# Patient Record
Sex: FEMALE | Race: WHITE | NOT HISPANIC OR LATINO | Employment: STUDENT | URBAN - METROPOLITAN AREA
[De-identification: names, ages, dates, MRNs, and addresses within clinical notes are randomized per-mention and may not be internally consistent; named-entity substitution may affect disease eponyms.]

---
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
## 2022-01-07 ENCOUNTER — TELEPHONE (OUTPATIENT)
Dept: PSYCHIATRY | Facility: CLINIC | Age: 22
End: 2022-01-07
Payer: MEDICAID

## 2022-01-10 ENCOUNTER — PATIENT MESSAGE (OUTPATIENT)
Dept: PSYCHIATRY | Facility: CLINIC | Age: 22
End: 2022-01-10
Payer: MEDICAID

## 2022-01-10 ENCOUNTER — OFFICE VISIT (OUTPATIENT)
Dept: PSYCHIATRY | Facility: CLINIC | Age: 22
End: 2022-01-10
Payer: MEDICAID

## 2022-01-10 DIAGNOSIS — F41.1 GENERALIZED ANXIETY DISORDER: ICD-10-CM

## 2022-01-10 DIAGNOSIS — F31.62 MIXED BIPOLAR AFFECTIVE DISORDER, MODERATE: Primary | ICD-10-CM

## 2022-01-10 DIAGNOSIS — F90.9 ATTENTION DEFICIT HYPERACTIVITY DISORDER (ADHD), UNSPECIFIED ADHD TYPE: ICD-10-CM

## 2022-01-10 PROCEDURE — 90834 PSYTX W PT 45 MINUTES: CPT | Mod: AJ,HA,95, | Performed by: SOCIAL WORKER

## 2022-01-10 PROCEDURE — 90834 PR PSYCHOTHERAPY W/PATIENT, 45 MIN: ICD-10-PCS | Mod: AJ,HA,95, | Performed by: SOCIAL WORKER
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
## 2022-01-31 ENCOUNTER — TELEPHONE (OUTPATIENT)
Dept: PSYCHIATRY | Facility: CLINIC | Age: 22
End: 2022-01-31
Payer: MEDICAID

## 2022-01-31 ENCOUNTER — OFFICE VISIT (OUTPATIENT)
Dept: PSYCHIATRY | Facility: CLINIC | Age: 22
End: 2022-01-31
Payer: MEDICAID

## 2022-01-31 ENCOUNTER — PATIENT MESSAGE (OUTPATIENT)
Dept: PSYCHIATRY | Facility: CLINIC | Age: 22
End: 2022-01-31

## 2022-01-31 DIAGNOSIS — F41.1 GENERALIZED ANXIETY DISORDER: ICD-10-CM

## 2022-01-31 DIAGNOSIS — F31.62 MIXED BIPOLAR AFFECTIVE DISORDER, MODERATE: Primary | ICD-10-CM

## 2022-01-31 PROCEDURE — 99215 OFFICE O/P EST HI 40 MIN: CPT | Mod: HP,HA,95, | Performed by: PSYCHOLOGIST

## 2022-01-31 PROCEDURE — 1159F PR MEDICATION LIST DOCUMENTED IN MEDICAL RECORD: ICD-10-PCS | Mod: HP,HA,CPTII,95 | Performed by: PSYCHOLOGIST

## 2022-01-31 PROCEDURE — 99215 PR OFFICE/OUTPT VISIT, EST, LEVL V, 40-54 MIN: ICD-10-PCS | Mod: HP,HA,95, | Performed by: PSYCHOLOGIST

## 2022-01-31 PROCEDURE — 1159F MED LIST DOCD IN RCRD: CPT | Mod: HP,HA,CPTII,95 | Performed by: PSYCHOLOGIST

## 2022-01-31 RX ORDER — ARIPIPRAZOLE 15 MG/1
15 TABLET ORAL DAILY
Qty: 30 TABLET | Refills: 0 | Status: SHIPPED | OUTPATIENT
Start: 2022-02-07 | End: 2022-03-07 | Stop reason: SDUPTHER

## 2022-01-31 RX ORDER — ARIPIPRAZOLE 10 MG/1
10 TABLET ORAL DAILY
Qty: 7 TABLET | Refills: 0 | Status: SHIPPED | OUTPATIENT
Start: 2022-01-31 | End: 2022-02-07

## 2022-01-31 RX ORDER — ARIPIPRAZOLE 300 MG
300 KIT INTRAMUSCULAR
Qty: 1 EACH | Refills: 11 | Status: SHIPPED | OUTPATIENT
Start: 2022-02-21 | End: 2022-05-11

## 2022-01-31 NOTE — PATIENT INSTRUCTIONS
"[facility] OF [facility]   PATIENT INFORMATION    We appreciate the opportunity to participate in your medical care and hope the following protocols will make it easier for you to receive quality treatment in our department.    PUNCTUALITY: Your appointment is scheduled for a fixed amount of time, reserved especially for you.  To get the benefit of your appointment, please arrive at least 15 minutes early to allow time for traffic, parking and registration.  Should you arrive more than 15 minutes late to your appointment, you will be rescheduled in order to assure your clinician has adequate time to assess you and provide helpful care.      APPOINTMENTS: Appointments are made by the nursing/front office staff or through the patient portal. Providers do not have access  to schedule appointments. Walk in appointments are not available. FOR EMERGENCIES, PLEASE GO THE CLOSEST EMERGENCY ROOM.    CANCELLATION/MISSED APPOINTMENTS:   In order to receive quality care, all appointments must be kept.  If you are unable to keep an appointment, please reschedule at least 3 days prior if possible. Late cancellations (within 24 hours of the appointment) and repeated no-show appointments may result in dismissal from the clinic. After two no show/late cancellation visits, you will receive a notice letter, alerting you to keep visits to prevent department dismissal. If another visit is missed after receipt of the notice, you will be discharged from the clinic. This policy is in effect to allow for other individuals on a long waiting list to be seen as soon as possible. Unlike other branches of medicine where several individuals can be scheduled in a 30 minute time slot, only one individual can be scheduled in any time slot in Psychiatry.     MESSAGES: For simple questions/concerns, you may contact your individual providers electronically through the "[facility]" portal or by calling [phone] " with messages relayed via office staff. If relevant, include pharmacy name and phone number, date of last visit and next scheduled visit, phone number where you can be reached throughout the day, and whether leaving a voicemail or message on an answering machine is acceptable. Messages will be returned by the Medical Assistant or Office Staff after your provider has reviewed the message.  Please allow 24 hours for a returned message before leaving another message. Messages will be checked each workday (Monday through Friday) during office hours (8:00 a.m. and 5:00 p.m.) and returned at most within one business day.  You may leave a non-urgent message after hours. Note that psychotherapy and medication management are not appropriate by telephone or the patient portal.    PRESCRIPTION REFILLS:  Please communicate with your prescriber about any refills you need during your appointment. You may also request refills through the [facility] portal (preferred) or by calling the clinic. Prescriptions will be filled during office hours.      Please do not wait until you are completely out of medication to request refills. Same day refills are not always possible. Patients may experience symptoms of withdrawal if they run out of medications. The patient assumes all responsibility when there is an issue with non-compliance with follow-up appointments and medications.   Some medications are controlled and regulated by the FDA and ROX. Some of these medications can not be refilled before 30 days and require a face to face appointment.     PAPERWORK REQUESTS: If you have any forms or letters that need to be completed by your doctor, please present these at the beginning of the appointment to ensure that information needed to complete them is obtained during the office visit. Paperwork will be returned within 7-10 business days. Staff will call you to  the paperwork when completed.    SPECIAL EVALUATIONS: Please note that  "our department is treatment-focused. As such, we focus on treatment-oriented evaluations and do not perform specialty or "forensic" evaluations. Examples are listed below.     Disability: We do not do disability evaluations.  Please contact Social Security Administration for evaluations and determinations. You will then sign releases allowing for records from your treatment providers to be forwarded to Social Security Administration to use in their evaluation.   Gun Permit: We do not offer Sound Judgment Evaluations or assessments leading to gun ownership, nor do we fill out or file paperwork relevant to owning, concealing or purchasing a firearm.   Emotional Support      Animals (IAN): We do not provide documentation, including letters, to aid in the acclamation that an Emotional Support Animal is required. Note that ESAs are not trained to perform tasks or recognize particular signs or symptoms. Rather, they are distinguished by the close, emotional, and supportive bond between the animal and the owner.       SAMPLES: We do not provide samples of any medications. If you have financial difficulties and are on a limited income, you may qualify for Patient Assistance Programs from various pharmaceutical companies. This will require that you complete paperwork with your financial information, but this does not guarantee that the company will approve the application. Alternative medication options can be discussed.    REFERRALS/COORDINATION: You will be referred to other providers if we feel unable to adequately diagnose or treat your particular condition, or if collaboration with another provider would allow for better management of your condition.    This document is for information purposes only. Please refer to the full disclaimer and copyright statement available at http://www.[facility].health.wa.gov.au regarding the information from this website before making use of such information.  See website " www.cci.health.wa.gov.au for more handouts and resources.    What is Sleep Hygiene?  Sleep hygiene is the term used to describe good sleep habits. Considerable research has gone into developing a set of guidelines and tips which are designed to enhance good sleeping, and there is much evidence to suggest that these strategies can provide long-term solutions to sleep difficulties. There are many medications which are used to treat insomnia,  but these tend to be only effective in the short-term. Ongoing use of sleeping pills may lead to dependence and interfere with developing good sleep habits independent of medication,  thereby prolonging sleep difficulties. Talk to your health professional about what is right for you, but we recommend good sleep hygiene as an important part of treating insomnia,  either with other strategies such as medication or cognitive therapy or alone.    Sleep Hygiene Tips  1) Get regular. One of the best ways to train your body to sleep well is to go to bed and get up at more or less the same time every day, even on weekends and days off! This regular rhythm will make you feel better and will give your body something to work from.  2) Sleep when sleepy. Only try to sleep when you actually feel tired or sleepy, rather than spending too much time awake in bed.  3) Get up & try again. If you havent been able to get to sleep after about 20 minutes or more, get up and do something calming or boring until you feel sleepy, then return to bed and try again. Sit quietly on the couch with the lights off (bright light will tell your brain that it is time to wake up), or read something boring like the phone book. Avoid doing anything that is too stimulating or interesting, as this will wake you up even more.  4) Avoid caffeine & nicotine. It is best to avoid consuming any caffeine (in coffee, tea, cola drinks, chocolate, and some medications) or nicotine (cigarettes) for at least 4-6 hours before  going to bed. These substances act as stimulants and interfere with the ability to fall asleep   5) Avoid alcohol. It is also best to avoid alcohol for at least 4-6 hours before going to bed. Many people believe that alcohol is relaxing and helps them to get to sleep at first, but it actually interrupts the quality of sleep.  6) Bed is for sleeping. Try not to use your bed for anything other than sleeping and sex, so that your body comes to associate bed with sleep. If you use bed as a place to watch TV, eat, read, work on your laptop, pay bills, and other things, your body will not learn this connection.  7) No naps. It is best to avoid taking naps during the day, to make sure that you are tired at bedtime. If you cant make it through the day without a nap, make sure it is for less than an hour and before 3pm.  8) Sleep rituals. You can develop your own rituals of things to remind your body that it is time to sleep - some people find it useful to do relaxing stretches or breathing exercises for 15 minutes before bed each night, or sit calmly with a cup of caffeine-free tea.  9) Bathtime. Having a hot bath 1-2 hours before bedtime can be useful, as it will raise your body temperature, causing you to feel sleepy as your body temperature drops again. Research shows that sleepiness is associated with a drop in body temperature.  10) No clock-watching. Many people who struggle with sleep tend to watch the clock too much. Frequently checking the clock during the night can wake you up (especially if you turn  on the light to read the time) and reinforces negative thoughts such as Oh no, look how late it is, Ill never get to sleep or its so early, I have only slept for 5 hours, this is  terrible.  11) Use a sleep diary. This worksheet can be a useful way of making sure you have the right facts about your sleep, rather than making assumptions. Because a diary involves watching  the clock (see point 10) it is a good  idea to only use it for two weeks to get an idea of what is going and then perhaps two months down the track to see how you are progressing.  12) Exercise. Regular exercise is a good idea to help with good sleep, but try not to do strenuous exercise in the 4 hours before bedtime. Morning walks are a great way to start the day feeling refreshed!  13) Eat right. A healthy, balanced diet will help you to sleep well, but timing is important. Some people find that a very empty stomach at bedtime is distracting, so it can be useful  to have a light snack, but a heavy meal soon before bed can also interrupt sleep. Some people recommend a warm glass of milk, which contains tryptophan, which acts as a natural  sleep inducer.  14) The right space. It is very important that your bed and bedroom are quiet and comfortable for sleeping. A cooler room with enough blankets to stay warm is best, and make sure you have curtains or an eyemask to block out early morning light and earplugs if there is noise outside your room.  15) Keep daytime routine the same. Even if you have a bad night sleep and are tired it is important that you try to keep your daytime activities the same as you had planned. That is,  dont avoid activities because you feel tired. This can reinforce the insomnia.    Call In if problems  Call Report Side Effects   Encouraged to follow up with primary care / Gen Med MD for continued monitoring of general health and wellness  Call 911 Or go to ER if Acute Concerns (especially if any thoughts of harm to self or other)    Abilify--oral 10 mg for one week, then 15 mg for 2 weeks--then injection while continuing 15 mg for another 2 weeks       Milli Medina, PhD,   Advanced Practice Medical Psychologist  [facility]  [address] [facility].  [place] [address]  [phone]   [phone] fax

## 2022-01-31 NOTE — TELEPHONE ENCOUNTER
Called Mahamed to inquire on appt in 3 weeks instead of 4--again reviewed dosing instructions--oral Abilify 10 mg for one week, then oral Abilify 15 mg for 2 weeks; will get injection at her appt and continue oral Abilify 15 mg for another 2 weeks, then stopping oral

## 2022-01-31 NOTE — PROGRESS NOTES
Outpatient Psychiatry Follow-Up Visit    1/31/2022    Timeframe: Corona Virus Outbreak     The patient location is: Patient's home/ Patient reported that his/her location at the time of this visit was in the [facility]     Visit type: Virtual visit with synchronous audio and video     Each patient to whom he or she provides medical services by telemedicine is: (1) informed of the relationship between the physician and patient and the respective role of any other health care provider with respect to management of the patient; and (2) notified that he or she may decline to receive medical services by telemedicine and may withdraw from such care at any time.    I also informed patient of the following:   Milli Medina, PhD, MPAP:  [place] medical license number: [id number]    My contact info:  [facility] / 2nd Floor  [address] [place] [address]   Ph: [phone]    If technology issues, call office phone: Ph: [phone]  If crisis: Dial 911 or go to nearest Emergency Room (ER)  If questions related to privacy practices: contact [facility] Information Department: [phone]    Chief Complaint:  Mahamed Fernandez is a 21 y.o. female who presents today for follow-up of mood, anxiety and inattention/distractibility .        Impressions/Plan from last visit: Tiffanie (mom) and Mahamed attended the visit. Mahamed has started pulling her hair and eyelashes again. Mom said that she did it years ago and thinks that she was on Abilify then for it. She said that she does it when she is nervous--and that's daily. This started back within the last month. She and her boyfriend have been arguing more. Mom said that her worry seems to be about relationships and financial choices, etc. She is in school this summer--Wed through Sat 9-5:30 and works daily until 10:30 pm. She had been going to the gym but has not been in awhile. We agreed to increase her Prozac and continue  her Zyprexa. She also needs to find better ways of coping with her anxiety and will address this in her therapy.    Interval History and Content of Current Session: Mahamed attended her virtual visit--Tiffanie (mom) was also present in the living room. She has not been taking any of her medicines and wants an injectable instead of taking a pill, hoping that this will improve compliance. She reported that she has mood swings--fairly rapidly--twice or more daily. We discussed Abilify--she has taken this medicine in the past, but in lower doses as an adjunct medicine (2 and 5 mg). She recently restarted therapy and plans to continue with that. She denied any self-harm. We will need labs, and she is due to see her PCP--will ask Dr. Muñoz to get those (fasting glucose and CBC with differential) for baseline. Abilify--oral 10 mg for one week, then 15 mg for 2 weeks--then injection while continuing 15 mg for another 2 weeks.      GAD7 1/31/2022 1/10/2022 7/19/2021   1. Feeling nervous, anxious, or on edge? 2 2 2   2. Not being able to stop or control worrying? 2 1 2   3. Worrying too much about different things? 2 1 2   4. Trouble relaxing? 2 2 2   5. Being so restless that it is hard to sit still? 0 0 0   6. Becoming easily annoyed or irritable? 2 1 0   7. Feeling afraid as if something awful might happen? 0 0 0   8. If you checked off any problems, how difficult have these problems made it for you to do your work, take care of things at home, or get along with other people? - - 1   VIELKA-7 Score 10 7 8      0-4 = Minimal anxiety  5-9 = Mild anxiety  10-14 = Moderate anxiety  15-21 = Severe anxiety     Review of Systems   · PSYCHIATRIC: Pertinant items are noted in the narrative.    Past Medical, Family and Social History: The patient's past medical, family and social history have been reviewed and updated as appropriate within the electronic medical record - see encounter notes.      Current Outpatient Medications:      cholecalciferol, vitamin D3, 1,250 mcg (50,000 unit) capsule, Take 50,000 Units by mouth every 7 days., Disp: , Rfl:     docusate sodium (COLACE) 100 MG capsule, Take 100 mg by mouth daily as needed., Disp: , Rfl:     FLUoxetine 40 MG capsule, Take 1 capsule by mouth once daily, Disp: 30 capsule, Rfl: 0    OLANZapine (ZYPREXA) 7.5 MG tablet, Take 1 tablet (7.5 mg total) by mouth once daily., Disp: 30 tablet, Rfl: 2    Current Facility-Administered Medications:     levonorgestreL 14 mcg/24 hrs (3 yrs) 13.5 mg IUD 14 mcg, 14 mcg, Intrauterine, , Arina Ruiz MD, 14 mcg at 03/29/21 1300    Compliance: no    Side effects: see above    Risk Parameters:  Patient reports no suicidal ideation  Patient reports no homicidal ideation  Patient reports no self-injurious behavior  Patient reports no violent behavior    Exam (detailed: at least 9 elements; comprehensive: all 15 elements)   Constitutional  Vitals:  Most recent vital signs were reviewed.   Last 3 sets of Vitals    Vitals - 1 value per visit 12/23/2020 3/29/2021 3/29/2021   SYSTOLIC 120 - 108   DIASTOLIC 68 - 74   Pulse 98 - -   Temp - - -   Resp - - -   SPO2 - - -   Weight (lb) 120.37 - 130.95   Weight (kg) 54.6 - 59.4   Height 62.5 - 62.5   BMI (Calculated) 21.7 - 23.6   VISIT REPORT - - -   Pain Score  - 0 -          General:  age appropriate, casually dressed, neatly groomed     Musculoskeletal  Muscle Strength/Tone:  no tremor, no tic   Gait & Station:  video visit     Psychiatric  Speech:  no latency; no press   Behavior: wnl   Mood & Affect:  happy/upbeat  congruent and appropriate   Thought Process:  normal and logical   Associations:  intact   Thought Content:  normal, no suicidality, no homicidality, delusions, or paranoia   Insight:  has awareness of illness   Judgement: behavior is adequate to circumstances   Orientation:  grossly intact   Memory: intact for content of interview   Language: grossly intact   Attention Span & Concentration:   Grossly intact   Fund of Knowledge:  intact and appropriate to age and level of education     Assessment and Diagnosis   Status/Progress: Based on the examination today, the patient's problem(s) is/are inadequately controlled.  New problems have not been presented today.   Co-morbidities and Lack of compliance are complicating management of the primary condition.  There are no active rule-out diagnoses for this patient at this time.     General Impression:     Encounter Diagnoses   Name Primary?    Mixed bipolar affective disorder, moderate Yes    Generalized anxiety disorder     Attention deficit hyperactivity disorder (ADHD), unspecified ADHD type        Intervention/Counseling/Treatment Plan   · Medication Management: Discussed risks, benefits, and alternatives to treatment plan documented above with patient. I answered all patient questions related to this plan, and patient expressed understanding and agreement.   Abilify--oral 10 mg for one week, then 15 mg for 2 weeks--then injection while continuing 15 mg for another 2 weeks  · continue therapy   · Schedule with PCP--needs labs (fasting glucose and CBC with differential)    Medication List with Changes/Refills   Current Medications    CHOLECALCIFEROL, VITAMIN D3, 1,250 MCG (50,000 UNIT) CAPSULE    Take 50,000 Units by mouth every 7 days.    DOCUSATE SODIUM (COLACE) 100 MG CAPSULE    Take 100 mg by mouth daily as needed.    FLUOXETINE 40 MG CAPSULE    Take 1 capsule by mouth once daily    OLANZAPINE (ZYPREXA) 7.5 MG TABLET    Take 1 tablet (7.5 mg total) by mouth once daily.        Return to Clinic: 3 weeks--in clinic    Time spent with pt including note preparation: 48 minutes       Milli Medina, PhD, MP  Advanced Practice Medical Psychologist  [facility]  [address] [place] [address].  [place] [id number]  [phone]   [phone] fax

## 2022-02-01 NOTE — PROGRESS NOTES
PCP is Adele Tsang MD, with [facility]    Sending note from yesterday day to her--requesting sharing labs

## 2022-02-01 NOTE — LETTER
February 1, 2022      Adele Tsang MD  [address]  [address] [address]  [facility] [address]         [facility]  [address] [facility]  [place] [address]  Phone: [phone]  Fax: [phone]   Patient: Mahamed Fernandez   MR Number: [id number]   YOB: 2000   Date of Visit: 2/1/2022       Dear Dr. Tsang:    I saw Mahamed Fernandez for follow-up yesterday. Below are the relevant portions of my assessment and plan of care. Please consider ordering the labs below and sending results to me for coordination of care.    · Medication Management: Discussed risks, benefits, and alternatives to treatment plan documented above with patient. I answered all patient questions related to this plan, and patient expressed understanding and agreement.   Abilify--oral 10 mg for one week, then 15 mg for 2 weeks--then injection while continuing 15 mg for another 2 weeks  · continue therapy   · Schedule with PCP--needs labs (fasting glucose and CBC with differential)    If you have questions, please do not hesitate to call me. I look forward to following Mahamed along with you.    Sincerely,    Milli Medina, PhD, MPAP   Advanced Practice Medical Psychologist

## 2022-02-16 ENCOUNTER — PATIENT MESSAGE (OUTPATIENT)
Dept: PSYCHIATRY | Facility: CLINIC | Age: 22
End: 2022-02-16
Payer: MEDICAID

## 2022-03-04 ENCOUNTER — PATIENT MESSAGE (OUTPATIENT)
Dept: PSYCHIATRY | Facility: CLINIC | Age: 22
End: 2022-03-04
Payer: MEDICAID

## 2022-03-07 DIAGNOSIS — F31.62 MIXED BIPOLAR AFFECTIVE DISORDER, MODERATE: ICD-10-CM

## 2022-03-07 RX ORDER — ARIPIPRAZOLE 15 MG/1
15 TABLET ORAL DAILY
Qty: 21 TABLET | Refills: 0 | Status: SHIPPED | OUTPATIENT
Start: 2022-03-07 | End: 2022-03-28

## 2022-03-15 ENCOUNTER — OFFICE VISIT (OUTPATIENT)
Dept: PSYCHIATRY | Facility: CLINIC | Age: 22
End: 2022-03-15
Payer: MEDICAID

## 2022-03-15 ENCOUNTER — CLINICAL SUPPORT (OUTPATIENT)
Dept: PSYCHIATRY | Facility: CLINIC | Age: 22
End: 2022-03-15
Payer: MEDICAID

## 2022-03-15 VITALS
BODY MASS INDEX: 21.94 KG/M2 | SYSTOLIC BLOOD PRESSURE: 110 MMHG | WEIGHT: 121.94 LBS | DIASTOLIC BLOOD PRESSURE: 71 MMHG | HEART RATE: 80 BPM

## 2022-03-15 DIAGNOSIS — F31.62 MIXED BIPOLAR AFFECTIVE DISORDER, MODERATE: Primary | ICD-10-CM

## 2022-03-15 DIAGNOSIS — F41.1 GENERALIZED ANXIETY DISORDER: ICD-10-CM

## 2022-03-15 PROCEDURE — 3078F PR MOST RECENT DIASTOLIC BLOOD PRESSURE < 80 MM HG: ICD-10-PCS | Mod: HP,HA,CPTII, | Performed by: PSYCHOLOGIST

## 2022-03-15 PROCEDURE — 3008F PR BODY MASS INDEX (BMI) DOCUMENTED: ICD-10-PCS | Mod: HP,HA,CPTII, | Performed by: PSYCHOLOGIST

## 2022-03-15 PROCEDURE — 99999 PR PBB SHADOW E&M-EST. PATIENT-LVL I: CPT | Mod: PBBFAC,HA,,

## 2022-03-15 PROCEDURE — 99212 OFFICE O/P EST SF 10 MIN: CPT | Mod: PBBFAC,27 | Performed by: PSYCHOLOGIST

## 2022-03-15 PROCEDURE — 99999 PR PBB SHADOW E&M-EST. PATIENT-LVL II: CPT | Mod: PBBFAC,HA,, | Performed by: PSYCHOLOGIST

## 2022-03-15 PROCEDURE — 99211 OFF/OP EST MAY X REQ PHY/QHP: CPT | Mod: PBBFAC

## 2022-03-15 PROCEDURE — 3078F DIAST BP <80 MM HG: CPT | Mod: HP,HA,CPTII, | Performed by: PSYCHOLOGIST

## 2022-03-15 PROCEDURE — 3008F BODY MASS INDEX DOCD: CPT | Mod: HP,HA,CPTII, | Performed by: PSYCHOLOGIST

## 2022-03-15 PROCEDURE — 99999 PR PBB SHADOW E&M-EST. PATIENT-LVL I: ICD-10-PCS | Mod: PBBFAC,HA,,

## 2022-03-15 PROCEDURE — 99214 PR OFFICE/OUTPT VISIT, EST, LEVL IV, 30-39 MIN: ICD-10-PCS | Mod: HP,HA,S$PBB, | Performed by: PSYCHOLOGIST

## 2022-03-15 PROCEDURE — 99214 OFFICE O/P EST MOD 30 MIN: CPT | Mod: HP,HA,S$PBB, | Performed by: PSYCHOLOGIST

## 2022-03-15 PROCEDURE — 3074F PR MOST RECENT SYSTOLIC BLOOD PRESSURE < 130 MM HG: ICD-10-PCS | Mod: HP,HA,CPTII, | Performed by: PSYCHOLOGIST

## 2022-03-15 PROCEDURE — 99999 PR PBB SHADOW E&M-EST. PATIENT-LVL II: ICD-10-PCS | Mod: PBBFAC,HA,, | Performed by: PSYCHOLOGIST

## 2022-03-15 PROCEDURE — 3074F SYST BP LT 130 MM HG: CPT | Mod: HP,HA,CPTII, | Performed by: PSYCHOLOGIST

## 2022-03-15 RX ADMIN — ARIPIPRAZOLE 300 MG: KIT at 09:03

## 2022-03-15 NOTE — PROGRESS NOTES
3/15/22 10:05 am      Abilify Maintena 300mg  NDC NUMBER: 35932-429-22  LOT NUMBER: [id number]  EXP DATE:         Abilify Maintena 300mg      Inject 300mg  into the muscle every 28 days.     Administered Abilify Maintena 300mg into left dosrsalgluteal        Patient was instructed to wait 15 minutes for any reaction to medication     Patient left at 10:15am with no reaction to medication

## 2022-03-15 NOTE — PROGRESS NOTES
"Outpatient Psychiatry Follow-Up Visit     3/15/2022      Clinical Status of Patient:  Outpatient (Ambulatory)    Chief Complaint:  Mahamed Fernandez is a 21 y.o. female who presents today for follow-up of mood and anxiety.      Impressions/Plan from last visit: Mahamed attended her virtual visit--Tiffanie (mom) was also present in the living room. She has not been taking any of her medicines and wants an injectable instead of taking a pill, hoping that this will improve compliance. She reported that she has mood swings--fairly rapidly--twice or more daily. We discussed Abilify--she has taken this medicine in the past, but in lower doses as an adjunct medicine (2 and 5 mg). She recently restarted therapy and plans to continue with that. She denied any self-harm. We will need labs, and she is due to see her PCP--will ask Dr. Muñoz to get those (fasting glucose and CBC with differential) for baseline. Abilify--oral 10 mg for one week, then 15 mg for 2 weeks--then injection while continuing 15 mg for another 2 weeks.    Interval History and Content of Current Session: Mahamed attended her visit. 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 She has a boyfriend--they have been together about 5 months. She said that her family "loves him." She said that she recently had a panic attack, and he held her and talked her through it. She has also been making friends and engaging in more social activities. She got labs at [facility]--they will get the results faxed to me. She will get the Abilify injection today; take the oral for another two weeks, and then will only be getting injections (every month). She has seen Ms. Cox for therapy and will continue to see her.  We are continuing her Abilify 15 mg for another 2 weeks--today gets injection; then monthly injections only.    (fasting glucose and CBC with differential) for baseline (pending from [facility])      GAD7 1/31/2022 " 1/10/2022 7/19/2021   1. Feeling nervous, anxious, or on edge? 2 2 2   2. Not being able to stop or control worrying? 2 1 2   3. Worrying too much about different things? 2 1 2   4. Trouble relaxing? 2 2 2   5. Being so restless that it is hard to sit still? 0 0 0   6. Becoming easily annoyed or irritable? 2 1 0   7. Feeling afraid as if something awful might happen? 0 0 0   8. If you checked off any problems, how difficult have these problems made it for you to do your work, take care of things at home, or get along with other people? - - 1   VIELKA-7 Score 10 7 8      0-4 = Minimal anxiety  5-9 = Mild anxiety  10-14 = Moderate anxiety  15-21 = Severe anxiety       Review of Systems   · PSYCHIATRIC: Pertinant items are noted in the narrative.    Past Medical, Family and Social History: The patient's past medical, family and social history have been reviewed and updated as appropriate within the electronic medical record - see encounter notes.      Current Outpatient Medications:     ARIPiprazole (ABILIFY) 15 MG Tab, Take 1 tablet (15 mg total) by mouth once daily. for 21 days, Disp: 21 tablet, Rfl: 0    ARIPiprazole SERS (ABILIFY MAINTENA) 300 mg injection, Inject 300 mg into the muscle every 28 days., Disp: 1 each, Rfl: 11    Current Facility-Administered Medications:     levonorgestreL 14 mcg/24 hrs (3 yrs) 13.5 mg IUD 14 mcg, 14 mcg, Intrauterine, , Arina Ruiz MD, 14 mcg at 03/29/21 1300    Compliance: yes    Side effects: None    Risk Parameters:  Patient reports no suicidal ideation  Patient reports no homicidal ideation  Patient reports no self-injurious behavior  Patient reports no violent behavior    Exam (detailed: at least 9 elements; comprehensive: all 15 elements)   Constitutional  Vitals:  Most recent vital signs were reviewed.   Last 3 sets of Vitals    Vitals - 1 value per visit 12/23/2020 3/29/2021 3/29/2021   SYSTOLIC 120 - 108   DIASTOLIC 68 - 74   Pulse 98 - -   Temp - - -   Resp - - -  "  SPO2 - - -   Weight (lb) 120.37 - 130.95   Weight (kg) 54.6 - 59.4   Height 62.5 - 62.5   BMI (Calculated) 21.7 - 23.6   VISIT REPORT - - -   Pain Score  - 0 -          General:  age appropriate, casually dressed, neatly groomed, wearing mask     Musculoskeletal  Muscle Strength/Tone:  no tremor, no tic   Gait & Station:  non-ataxic     Psychiatric  Speech:  no latency; no press   Behavior: wnl   Mood & Affect:  "tired but happy"  congruent and appropriate   Thought Process:  normal and logical   Associations:  intact   Thought Content:  normal, no suicidality, no homicidality, delusions, or paranoia   Insight:  has awareness of illness   Judgement: behavior is adequate to circumstances   Orientation:  grossly intact   Memory: intact for content of interview   Language: grossly intact   Attention Span & Concentration:  Grossly intact   Fund of Knowledge:  intact and appropriate to age and level of education     Assessment and Diagnosis   Status/Progress: Based on the examination today, the patient's problem(s) is/are improved.  New problems have not been presented today.   Co-morbidities are complicating management of the primary condition.  There are no active rule-out diagnoses for this patient at this time.     General Impression:     Encounter Diagnoses   Name Primary?    Mixed bipolar affective disorder, moderate Yes    Generalized anxiety disorder          Intervention/Counseling/Treatment Plan   · Medication Management: Discussed risks, benefits, and alternatives to treatment plan documented above with patient. I answered all patient questions related to this plan, and patient expressed understanding and agreement.   Abilify maintana 300 mg today and monthly; Abilify oral 15 mg for 2 weeks, then stop oral  · Continue therapy    Return to Clinic: 3 months, for appt; monthly for injections    Medication List with Changes/Refills   Current Medications    ARIPIPRAZOLE (ABILIFY) 15 MG TAB    Take 1 tablet (15 " mg total) by mouth once daily. for 21 days    ARIPIPRAZOLE SERS (ABILIFY MAINTENA) 300 MG INJECTION    Inject 300 mg into the muscle every 28 days.        Time spent with pt including note preparation: 32 minutes     Milli Medina, PhD, MP  Advanced Practice Medical Psychologist  [facility]  [address] [facility].  [place] [id number] [phone]   [phone] fax

## 2022-03-15 NOTE — PATIENT INSTRUCTIONS
"[facility] OF [facility]   PATIENT INFORMATION    We appreciate the opportunity to participate in your medical care and hope the following protocols will make it easier for you to receive quality treatment in our department.    PUNCTUALITY: Your appointment is scheduled for a fixed amount of time, reserved especially for you.  To get the benefit of your appointment, please arrive at least 15 minutes early to allow time for traffic, parking and registration.  Should you arrive more than 15 minutes late to your appointment, you will be rescheduled in order to assure your clinician has adequate time to assess you and provide helpful care.      APPOINTMENTS: Appointments are made by the nursing/front office staff or through the patient portal. Providers do not have access  to schedule appointments. Walk in appointments are not available. FOR EMERGENCIES, PLEASE GO THE CLOSEST EMERGENCY ROOM.    CANCELLATION/MISSED APPOINTMENTS:   In order to receive quality care, all appointments must be kept.  If you are unable to keep an appointment, please reschedule at least 3 days prior if possible. Late cancellations (within 24 hours of the appointment) and repeated no-show appointments may result in dismissal from the clinic. After two no show/late cancellation visits, you will receive a notice letter, alerting you to keep visits to prevent department dismissal. If another visit is missed after receipt of the notice, you will be discharged from the clinic. This policy is in effect to allow for other individuals on a long waiting list to be seen as soon as possible. Unlike other branches of medicine where several individuals can be scheduled in a 30 minute time slot, only one individual can be scheduled in any time slot in Psychiatry.     MESSAGES: For simple questions/concerns, you may contact your individual providers electronically through the "[facility]" portal or by calling [phone] " with messages relayed via office staff. If relevant, include pharmacy name and phone number, date of last visit and next scheduled visit, phone number where you can be reached throughout the day, and whether leaving a voicemail or message on an answering machine is acceptable. Messages will be returned by the Medical Assistant or Office Staff after your provider has reviewed the message.  Please allow 24 hours for a returned message before leaving another message. Messages will be checked each workday (Monday through Friday) during office hours (8:00 a.m. and 5:00 p.m.) and returned at most within one business day.  You may leave a non-urgent message after hours. Note that psychotherapy and medication management are not appropriate by telephone or the patient portal.    PRESCRIPTION REFILLS:  Please communicate with your prescriber about any refills you need during your appointment. You may also request refills through the [facility] portal (preferred) or by calling the clinic. Prescriptions will be filled during office hours.     Please do not wait until you are completely out of medication to request refills. Same day refills are not always possible. Patients may experience symptoms of withdrawal if they run out of medications. The patient assumes all responsibility when there is an issue with non-compliance with follow-up appointments and medications.  Some medications are controlled and regulated by the FDA and ROX. Some of these medications can not be refilled before 30 days and require a face to face appointment.     PAPERWORK REQUESTS: If you have any forms or letters that need to be completed by your doctor, please present these at the beginning of the appointment to ensure that information needed to complete them is obtained during the office visit. Paperwork will be returned within 7-10 business days. Staff will call you to  the paperwork when completed.    SPECIAL EVALUATIONS: Please note that our  "department is treatment-focused. As such, we focus on treatment-oriented evaluations and do not perform specialty or "forensic" evaluations. Examples are listed below.    Disability: We do not do disability evaluations.  Please contact Social Security Administration for evaluations and determinations. You will then sign releases allowing for records from your treatment providers to be forwarded to Social Security Administration to use in their evaluation.  Gun Permit: We do not offer Sound Judgment Evaluations or assessments leading to gun ownership, nor do we fill out or file paperwork relevant to owning, concealing or purchasing a firearm.  Emotional Support     Animals (IAN): We do not provide documentation, including letters, to aid in the acclamation that an Emotional Support Animal is required. Note that ESAs are not trained to perform tasks or recognize particular signs or symptoms. Rather, they are distinguished by the close, emotional, and supportive bond between the animal and the owner.       SAMPLES: We do not provide samples of any medications. If you have financial difficulties and are on a limited income, you may qualify for Patient Assistance Programs from various pharmaceutical companies. This will require that you complete paperwork with your financial information, but this does not guarantee that the company will approve the application. Alternative medication options can be discussed.    REFERRALS/COORDINATION: You will be referred to other providers if we feel unable to adequately diagnose or treat your particular condition, or if collaboration with another provider would allow for better management of your condition.     Call In if problems  Call Report Side Effects   Encouraged to follow up with primary care / Gen Med MD for continued monitoring of general health and wellness  Call 911 Or go to ER if Acute Concerns (especially if any thoughts of harm to self or other)        Milli Medina, PhD, " PONCHO  Advanced Practice Medical Psychologist  [facility]  [address]  [place] [id number] [phone] ph  [phone] fax

## 2022-03-24 ENCOUNTER — PATIENT MESSAGE (OUTPATIENT)
Dept: PSYCHIATRY | Facility: CLINIC | Age: 22
End: 2022-03-24
Payer: MEDICAID

## 2022-03-29 ENCOUNTER — OFFICE VISIT (OUTPATIENT)
Dept: PSYCHIATRY | Facility: CLINIC | Age: 22
End: 2022-03-29
Payer: MEDICAID

## 2022-03-29 DIAGNOSIS — F41.1 GENERALIZED ANXIETY DISORDER: ICD-10-CM

## 2022-03-29 DIAGNOSIS — F90.9 ATTENTION DEFICIT HYPERACTIVITY DISORDER (ADHD), UNSPECIFIED ADHD TYPE: ICD-10-CM

## 2022-03-29 DIAGNOSIS — F31.62 MIXED BIPOLAR AFFECTIVE DISORDER, MODERATE: Primary | ICD-10-CM

## 2022-03-29 PROCEDURE — 90834 PR PSYCHOTHERAPY W/PATIENT, 45 MIN: ICD-10-PCS | Mod: AJ,HA,95, | Performed by: SOCIAL WORKER

## 2022-03-29 PROCEDURE — 90834 PSYTX W PT 45 MINUTES: CPT | Mod: AJ,HA,95, | Performed by: SOCIAL WORKER

## 2022-03-29 NOTE — PROGRESS NOTES
Individual Psychotherapy Follow-up Visit Progress Note (PhD/LCSW)     Outpatient Psychotherapy - 45 minutes with patient (38-52 minutes) - [id number]    Date: 03/29/2022    Visit Type: Telehealth    Due to the nature of this visit type, a virtual visit with synchronous audio and video, each patient to whom this provider administers behavioral health services by telemedicine is: (1) informed of the relationship between the provider and patient and the respective role of any other health care provider with respect to management of the patient; and (2) notified that he or she may decline to receive services by telemedicine and may withdraw from such care at any time.    The patient was informed of the following:     Provider's contact info:  [facility]  [address] [place] [address], 3rd Floor, [address]  [place] [address]  (Phone) [phone]    If technology issues occur, call office phone: Ph: [phone]  If crisis: Dial 911 or go to nearest Emergency Room (ER)  If questions related to privacy practices: contact [facility] Information Department: [phone]    For security purposes, the pt identified that they were [place] [address] [place] [address] during today's session and contact number is [phone] (home) .    The pt's emergency contact(s) is Extended Emergency Contact Information  Primary Emergency Contact: shelia steen  Address: [address] [place] [address] [facility]  Home Phone: [phone]  Mobile Phone: [phone]  Relation: Mother  Secondary Emergency Contact: Umer Fernandez  Address: [address] [place], [place] [address] [place] [address]  Home Phone: [phone]  Mobile Phone: [phone]  Relation: Father.    Crisis Disclaimer: Patient was informed that due to the virtual nature of the visit, that if a crisis develops, protocols will be implemented to ensure patient safety, including  "but not limited to: 1) Initiating a welfare check with local law enforcement and/or 2) Calling 911    3/29/2022  MRN: [id number]  Primary Care Provider: Bessy Bryant MD    Mahamed Fernandez is a 21 y.o. female who presents today for follow-up of mood disorder and anxiety. Met with patient.      Preferred Name: Mahamed     Subjective:       Content of Current Session: Pt reported, "I'm ok but I've been anxious lately" upon entry to this session. LCSW utilized active listening/reflection to engage with pt and review experiences since their last session with this provider. 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 LCSW utilized cognitive processing and supportive therapy. LCSW and pt discussed utilization of mindfulness skills to quell her anxiety response. LCSW and pt discussed utilization of positive affirmations to improve her confidence in her skills. LCSW and pt discussed utilization of her ADHD accommodations to support her during her exam. LCSW and pt reviewed her grounding skills and their application in her upcoming exam. LCSW and pt verbally processed skill application in her upcoming week routine. Pt reported her moods had also been stable since initiating her Abilify injection. LCSW praised pt's medication compliance and encouraged her application of coping skills to further her positive progress. Pt responded appropriately to aforementioned interventions. Pt denied SI/HI/AVH.     Therapeutic Interventions Utilized During Current Session: Cognitive Processing Therapy, Mindfulness-Based Cognitive Therapy, Strength-Based Therapy, Supportive Therapy    GAD7 3/29/2022 1/31/2022 1/10/2022 7/19/2021 6/16/2021 6/8/2021 6/7/2021   1. Feeling nervous, anxious, or on edge? 0 2 2 2 3 1 1   2. Not being able to stop or control worrying? 0 2 1 2 0 0 0   3. Worrying too much about different things? 0 2 1 2 1 0 1   4. Trouble relaxing? 0 2 2 2 " 1 1 1   5. Being so restless that it is hard to sit still? 0 0 0 0 2 1 1   6. Becoming easily annoyed or irritable? 0 2 1 0 1 1 1   7. Feeling afraid as if something awful might happen? 0 0 0 0 3 0 0   8. If you checked off any problems, how difficult have these problems made it for you to do your work, take care of things at home, or get along with other people? - - - 1 1 0 -   VIELKA-7 Score 0 10 7 8 11 4 5      0-4 = Minimal anxiety  5-9 = Mild anxiety  10-14 = Moderate anxiety  15-21 = Severe anxiety     PHQ-9 Depression Patient Health Questionnaire 3/29/2022 1/10/2022 7/19/2021 6/7/2021 2/17/2021 1/15/2021   Patient agreed to terms: Yes Yes Yes - - -   Little interest or pleasure in doing things 0 0 0 0 0 0   Feeling down, depressed, or hopeless 0 0 0 1 0 0   Trouble falling or staying asleep, or sleeping too much 0 0 0 0 0 0   Feeling tired or having little energy 0 0 0 0 0 0   Poor appetite or overeating 0 0 0 0 0 0   Feeling bad about yourself - or that you are a failure or have let yourself or your family down 0 0 2 0 0 0   Trouble concentrating on things, such as reading the newspaper or watching television 0 0 2 0 0 0   Moving or speaking so slowly that other people could have noticed. Or the opposite - being so fidgety or restless that you have been moving around a lot more than usual 0 0 0 0 0 0   Thoughts that you would be better off dead, or of hurting yourself in some way 0 0 0 - - -   PHQ-9 Total Score 0 0 4 - - -   If you checked off any problems, how difficult have these problems made it for you to do your work, take care of things at home, or get along with other people? Not difficult at all Not difficult at all Not difficult at all - - -   Interpretation Minimal or None Minimal or None - - - -     0-4 = No intervention  5 to 9 = Mild  10 to 14 = Moderate  15 to 19 = Moderately severe  ?20 = Severe      Objective:       Mental Status Evaluation  Appearance: unremarkable, age appropriate  Behavior:  friendly and cooperative, restless and fidgety  Speech: normal tone, normal rate, normal pitch, normal volume  Mood: anxious  Affect: congruent and appropriate  Thought Process: normal and logical  Thought Content: normal, no suicidality, no homicidality, delusions, or paranoia  Sensorium: grossly intact  Cognition: grossly intact  Insight: intact  Judgment: adequate to circumstances    Risk parameters:  Patient reports no suicidal ideation  Patient reports no homicidal ideation  Patient reports no self-injurious behavior  Patient reports no violent behavior      Assessment & Plan:     The patient's response to the interventions is accepting    The patient's progress toward treatment goals is fair     Homework assigned: none     Treatment plan:   A. Target symptoms: Anxiety and Mood Disorder   B. Therapeutic modalities: insight oriented psychotherapy, behavior modifying psychotherapy, supportive psychotherapy  C. Why chosen therapy is appropriate versus another modality: relevant to diagnosis, patient responds to this modality, evidence based practice   D. Outcome monitoring methods: self report, observation, rating scales, feedback from clinical staff      Visit Diagnosis:   1. Mixed bipolar affective disorder, moderate    2. Generalized anxiety disorder    3. Attention deficit hyperactivity disorder (ADHD), unspecified ADHD type        Follow-up: individual psychotherapy    Return to Clinic: 3 weeks  Pt Reported to Schedule via Epic EMR MyChart Application and/or Department Phoneline          Shruti Cox LCSW  03/29/2022   8:03 AM

## 2022-04-12 ENCOUNTER — CLINICAL SUPPORT (OUTPATIENT)
Dept: PSYCHIATRY | Facility: CLINIC | Age: 22
End: 2022-04-12
Payer: MEDICAID

## 2022-04-12 PROCEDURE — 99999 PR PBB SHADOW E&M-EST. PATIENT-LVL I: ICD-10-PCS | Mod: PBBFAC,HA,,

## 2022-04-12 PROCEDURE — 99211 OFF/OP EST MAY X REQ PHY/QHP: CPT | Mod: PBBFAC

## 2022-04-12 PROCEDURE — 99999 PR PBB SHADOW E&M-EST. PATIENT-LVL I: CPT | Mod: PBBFAC,HA,,

## 2022-04-12 RX ADMIN — ARIPIPRAZOLE 300 MG: KIT at 10:04

## 2022-04-12 NOTE — PROGRESS NOTES
4/12/22 10:35 am      Abilify Maintena 300mg  NDC NUMBER: [id number]  LOT NUMBER: [id number]  EXP DATE:         Abilify Maintena 300mg      Inject 300mg  into the muscle every 28 days.     Administered Abilify Maintena 300mg into RIGHT dosrsalgluteal        Patient was instructed to wait 15 minutes for any reaction to medication     Patient left at 10:35 am with no reaction to medication   Has Your Skin Lesion Been Treated?: not been treated Is This A New Presentation, Or A Follow-Up?: Skin Lesion

## 2022-04-28 ENCOUNTER — PATIENT MESSAGE (OUTPATIENT)
Dept: PSYCHIATRY | Facility: CLINIC | Age: 22
End: 2022-04-28
Payer: MEDICAID

## 2022-05-03 ENCOUNTER — PATIENT MESSAGE (OUTPATIENT)
Dept: PSYCHIATRY | Facility: CLINIC | Age: 22
End: 2022-05-03
Payer: MEDICAID

## 2022-05-05 ENCOUNTER — TELEPHONE (OUTPATIENT)
Dept: PSYCHIATRY | Facility: CLINIC | Age: 22
End: 2022-05-05
Payer: MEDICAID

## 2022-05-05 DIAGNOSIS — F41.1 GENERALIZED ANXIETY DISORDER: Primary | ICD-10-CM

## 2022-05-05 RX ORDER — ESCITALOPRAM OXALATE 10 MG/1
10 TABLET ORAL DAILY
Qty: 30 TABLET | Refills: 2 | Status: SHIPPED | OUTPATIENT
Start: 2022-05-05 | End: 2022-06-15 | Stop reason: SDUPTHER

## 2022-05-05 NOTE — TELEPHONE ENCOUNTER
Tried to call Mahamed but her phone is not accepting calls at this time.    Will send message regarding medicine

## 2022-05-11 DIAGNOSIS — F31.62 MIXED BIPOLAR AFFECTIVE DISORDER, MODERATE: ICD-10-CM

## 2022-05-11 DIAGNOSIS — F31.62 MIXED BIPOLAR AFFECTIVE DISORDER, MODERATE: Primary | ICD-10-CM

## 2022-05-11 RX ORDER — ARIPIPRAZOLE 400 MG
400 KIT INTRAMUSCULAR
Qty: 1 EACH | Refills: 11 | Status: SHIPPED | OUTPATIENT
Start: 2022-05-11 | End: 2022-10-17 | Stop reason: ALTCHOICE

## 2022-05-12 ENCOUNTER — PATIENT MESSAGE (OUTPATIENT)
Dept: PSYCHIATRY | Facility: CLINIC | Age: 22
End: 2022-05-12
Payer: MEDICAID

## 2022-05-13 ENCOUNTER — CLINICAL SUPPORT (OUTPATIENT)
Dept: PSYCHIATRY | Facility: CLINIC | Age: 22
End: 2022-05-13
Payer: MEDICAID

## 2022-05-13 ENCOUNTER — OFFICE VISIT (OUTPATIENT)
Dept: PSYCHIATRY | Facility: CLINIC | Age: 22
End: 2022-05-13
Payer: MEDICAID

## 2022-05-13 ENCOUNTER — TELEPHONE (OUTPATIENT)
Dept: PHARMACY | Facility: CLINIC | Age: 22
End: 2022-05-13
Payer: MEDICAID

## 2022-05-13 DIAGNOSIS — F41.1 GENERALIZED ANXIETY DISORDER: ICD-10-CM

## 2022-05-13 DIAGNOSIS — F31.62 MIXED BIPOLAR AFFECTIVE DISORDER, MODERATE: Primary | ICD-10-CM

## 2022-05-13 PROCEDURE — 99999 PR PBB SHADOW E&M-EST. PATIENT-LVL I: ICD-10-PCS | Mod: PBBFAC,HA,,

## 2022-05-13 PROCEDURE — 1159F MED LIST DOCD IN RCRD: CPT | Mod: HP,HA,CPTII,95 | Performed by: PSYCHOLOGIST

## 2022-05-13 PROCEDURE — 99999 PR PBB SHADOW E&M-EST. PATIENT-LVL I: CPT | Mod: PBBFAC,HA,,

## 2022-05-13 PROCEDURE — 99211 OFF/OP EST MAY X REQ PHY/QHP: CPT | Mod: PBBFAC

## 2022-05-13 PROCEDURE — 99214 PR OFFICE/OUTPT VISIT, EST, LEVL IV, 30-39 MIN: ICD-10-PCS | Mod: HP,HA,95, | Performed by: PSYCHOLOGIST

## 2022-05-13 PROCEDURE — 99214 OFFICE O/P EST MOD 30 MIN: CPT | Mod: HP,HA,95, | Performed by: PSYCHOLOGIST

## 2022-05-13 PROCEDURE — 1159F PR MEDICATION LIST DOCUMENTED IN MEDICAL RECORD: ICD-10-PCS | Mod: HP,HA,CPTII,95 | Performed by: PSYCHOLOGIST

## 2022-05-13 RX ORDER — ALPRAZOLAM 0.25 MG/1
0.25 TABLET ORAL 2 TIMES DAILY PRN
Qty: 28 TABLET | Refills: 0 | Status: SHIPPED | OUTPATIENT
Start: 2022-05-13 | End: 2022-06-15

## 2022-05-13 RX ADMIN — ARIPIPRAZOLE 400 MG: KIT at 08:05

## 2022-05-13 NOTE — PATIENT INSTRUCTIONS
"[facility]   PATIENT INFORMATION    We appreciate the opportunity to participate in your medical care and hope the following protocols will make it easier for you to receive quality treatment in our department.    PUNCTUALITY: Your appointment is scheduled for a fixed amount of time, reserved especially for you.  To get the benefit of your appointment, please arrive at least 15 minutes early to allow time for traffic, parking and registration.  Should you arrive more than 15 minutes late to your appointment, you will be rescheduled in order to assure your clinician has adequate time to assess you and provide helpful care.      APPOINTMENTS: Appointments are made by the nursing/front office staff or through the patient portal. Providers do not have access  to schedule appointments. Walk in appointments are not available. FOR EMERGENCIES, PLEASE GO THE CLOSEST EMERGENCY ROOM.    CANCELLATION/MISSED APPOINTMENTS:   In order to receive quality care, all appointments must be kept.  If you are unable to keep an appointment, please reschedule at least 3 days prior if possible. Late cancellations (within 24 hours of the appointment) and repeated no-show appointments may result in dismissal from the clinic. After two no show/late cancellation visits, you will receive a notice letter, alerting you to keep visits to prevent department dismissal. If another visit is missed after receipt of the notice, you will be discharged from the clinic. This policy is in effect to allow for other individuals on a long waiting list to be seen as soon as possible. Unlike other branches of medicine where several individuals can be scheduled in a 30 minute time slot, only one individual can be scheduled in any time slot in Psychiatry.     MESSAGES: For simple questions/concerns, you may contact your individual providers electronically through the "[facility]" portal or by calling [phone] " with messages relayed via office staff. If relevant, include pharmacy name and phone number, date of last visit and next scheduled visit, phone number where you can be reached throughout the day, and whether leaving a voicemail or message on an answering machine is acceptable. Messages will be returned by the Medical Assistant or Office Staff after your provider has reviewed the message.  Please allow 24 hours for a returned message before leaving another message. Messages will be checked each workday (Monday through Friday) during office hours (8:00 a.m. and 5:00 p.m.) and returned at most within one business day.  You may leave a non-urgent message after hours. Note that psychotherapy and medication management are not appropriate by telephone or the patient portal.    PRESCRIPTION REFILLS:  Please communicate with your prescriber about any refills you need during your appointment. You may also request refills through the [facility] portal (preferred) or by calling the clinic. Prescriptions will be filled during office hours.     Please do not wait until you are completely out of medication to request refills. Same day refills are not always possible. Patients may experience symptoms of withdrawal if they run out of medications. The patient assumes all responsibility when there is an issue with non-compliance with follow-up appointments and medications.  Some medications are controlled and regulated by the FDA and ROX. Some of these medications can not be refilled before 30 days and require a face to face appointment.     PAPERWORK REQUESTS: If you have any forms or letters that need to be completed by your doctor, please present these at the beginning of the appointment to ensure that information needed to complete them is obtained during the office visit. Paperwork will be returned within 7-10 business days. Staff will call you to  the paperwork when completed.    SPECIAL EVALUATIONS: Please note that our  "department is treatment-focused. As such, we focus on treatment-oriented evaluations and do not perform specialty or "forensic" evaluations. Examples are listed below.    Disability: We do not do disability evaluations.  Please contact Social Security Administration for evaluations and determinations. You will then sign releases allowing for records from your treatment providers to be forwarded to Social Security Administration to use in their evaluation.  Gun Permit: We do not offer Sound Judgment Evaluations or assessments leading to gun ownership, nor do we fill out or file paperwork relevant to owning, concealing or purchasing a firearm.  Emotional Support     Animals (IAN): We do not provide documentation, including letters, to aid in the acclamation that an Emotional Support Animal is required. Note that ESAs are not trained to perform tasks or recognize particular signs or symptoms. Rather, they are distinguished by the close, emotional, and supportive bond between the animal and the owner.       SAMPLES: We do not provide samples of any medications. If you have financial difficulties and are on a limited income, you may qualify for Patient Assistance Programs from various pharmaceutical companies. This will require that you complete paperwork with your financial information, but this does not guarantee that the company will approve the application. Alternative medication options can be discussed.    REFERRALS/COORDINATION: You will be referred to other providers if we feel unable to adequately diagnose or treat your particular condition, or if collaboration with another provider would allow for better management of your condition.       Call In if problems  Call Report Side Effects   Encouraged to follow up with primary care / Gen Med MD for continued monitoring of general health and wellness  Call 911 Or go to ER if Acute Concerns (especially if any thoughts of harm to self or other)  Message regarding " anxiety in a couple of weeks          Milli Medina, PhD, MP  Advanced Practice Medical Psychologist  [facility]  [address]  [place] [id number] [phone]   [phone] fax

## 2022-05-13 NOTE — PROGRESS NOTES
"Outpatient Psychiatry Follow-Up Visit    5/13/2022    Timeframe: Corona Virus Outbreak     The patient location is: Patient's home/ Patient reported that his/her location at the time of this visit was in the [facility]     Visit type: Virtual visit with synchronous audio and video--We had to use audio via speakerphone and continue the video through the Mychart due to technical difficulties and audio quality.    Each patient to whom he or she provides medical services by telehealth is: (1) informed of the relationship between the medical psychologist and patient and the respective role of any other health care provider with respect to management of the patient; and (2) notified that he or she may decline to receive medical services by telehealth and may withdraw from such care at any time.    I also informed patient of the following:   Milli Medina, PhD, MPAP:  [place] medical license number: [id number]    My contact info:  [facility] / 2nd Floor  [address] [place] [address]   Ph: [phone]    If technology issues, call office phone: Ph: [phone]  If crisis: Dial 911 or go to nearest Emergency Room (ER)  If questions related to privacy practices: contact [facility] Information Department: [phone]    Chief Complaint:  Mahamed Fernandez is a 21 y.o. female who presents today for follow-up of mood and anxiety.       Impressions/Plan from last visit: Mahamed attended her visit. 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 She has a boyfriend--they have been together about 5 months. She said that her family "loves him." She said that she recently had a panic attack, and he held her and talked her through it. She has also been making friends and engaging in more social activities. She got labs at [facility]--they will get the results faxed to me. She will get the Abilify injection today; take the oral " for another two weeks, and then will only be getting injections (every month). She has seen MsJulia Brooke for therapy and will continue to see her.  We are continuing her Abilify 15 mg for another 2 weeks--today gets injection; then monthly injections only.     (fasting glucose and CBC with differential) for baseline (pending from  General)    Interval History and Content of Current Session: Mahamed attended her virtual visit. Since we last met, Mahamed reported increased anxiety. We added Lexapro. We also discussed increasing her Abilify dosage to 400 mg. She has been highly anxious--she started working and is nervous about it and vomiting, often shaking. She is highly nervous today, too--crying some when discussing this--we discussed a short-term use of Xanax until her Lexapro becomes effective. We agreed that she would try 1/2 tab of 0.25 mg initially to see how it works. We will meet again in a couple of months in the clinic when it's time for her to get her injection if possible.  Medicines--continue Lexapro 10 mg; increase Abilify 400 mg (injection) monthly; add short-term Xanax 0.25 mg bid #28 (2 weeks).      GAD7 4/12/2022 3/29/2022 1/31/2022   1. Feeling nervous, anxious, or on edge? 0 0 2   2. Not being able to stop or control worrying? 2 0 2   3. Worrying too much about different things? 2 0 2   4. Trouble relaxing? 2 0 2   5. Being so restless that it is hard to sit still? 0 0 0   6. Becoming easily annoyed or irritable? 0 0 2   7. Feeling afraid as if something awful might happen? 0 0 0   8. If you checked off any problems, how difficult have these problems made it for you to do your work, take care of things at home, or get along with other people? - - -   VIELKA-7 Score 6 0 10      0-4 = Minimal anxiety  5-9 = Mild anxiety  10-14 = Moderate anxiety  15-21 = Severe anxiety       Review of Systems   · PSYCHIATRIC: Pertinant items are noted in the narrative.    Past Medical, Family and Social History: The  patient's past medical, family and social history have been reviewed and updated as appropriate within the electronic medical record - see encounter notes.      Current Outpatient Medications:     ALPRAZolam (XANAX) 0.25 MG tablet, Take 1 tablet (0.25 mg total) by mouth 2 (two) times daily as needed for Anxiety., Disp: 28 tablet, Rfl: 0    ARIPiprazole (ABILIFY MAINTENA) 400 mg sers syringe, Inject 400 mg into the muscle every 28 days., Disp: 1 each, Rfl: 11    EScitalopram oxalate (LEXAPRO) 10 MG tablet, Take 1 tablet (10 mg total) by mouth once daily., Disp: 30 tablet, Rfl: 2    Current Facility-Administered Medications:     aripiprazole (ABILIFY MAINTENA) 400 mg SERS syringe, 400 mg, Intramuscular, 1 time in Clinic/HOD, Milli Medina, PhD, MPAP    levonorgestreL 14 mcg/24 hrs (3 yrs) 13.5 mg IUD 14 mcg, 14 mcg, Intrauterine, , Arina Ruiz MD, 14 mcg at 03/29/21 1300    Compliance: yes    Side effects: None    Risk Parameters:  Patient reports no suicidal ideation  Patient reports no homicidal ideation  Patient reports no self-injurious behavior  Patient reports no violent behavior    Exam (detailed: at least 9 elements; comprehensive: all 15 elements)   Constitutional  Vitals:  Most recent vital signs were reviewed.   Last 3 sets of Vitals    Vitals - 1 value per visit 3/29/2021 3/29/2021 3/15/2022   SYSTOLIC - 108 110   DIASTOLIC - 74 71   Pulse - - 80   Temp - - -   Resp - - -   SPO2 - - -   Weight (lb) - 130.95 121.91   Weight (kg) - 59.4 55.3   Height - 62.5 -   BMI (Calculated) - 23.6 -   VISIT REPORT - - -   Pain Score  0 - -          General:  age appropriate, casually dressed, neatly groomed     Musculoskeletal  Muscle Strength/Tone:  no tremor, no tic   Gait & Station:  video visit     Psychiatric  Speech:  no latency; no press   Behavior: wnl   Mood & Affect:  anxious  congruent and appropriate, tearful   Thought Process:  normal and logical   Associations:  intact   Thought Content:  normal,  no suicidality, no homicidality, delusions, or paranoia   Insight:  has awareness of illness   Judgement: behavior is adequate to circumstances   Orientation:  grossly intact   Memory: intact for content of interview   Language: grossly intact   Attention Span & Concentration:  Grossly intact   Fund of Knowledge:  intact and appropriate to age and level of education     Assessment and Diagnosis   Status/Progress: Based on the examination today, the patient's problem(s) is/are better in some respects with anxiety worsening.  New problems have not been presented today.   Co-morbidities are complicating management of the primary condition.  There are no active rule-out diagnoses for this patient at this time.     General Impression:     Encounter Diagnoses   Name Primary?    Mixed bipolar affective disorder, moderate Yes    Generalized anxiety disorder          Intervention/Counseling/Treatment Plan   · Medication Management: Discussed risks, benefits, and alternatives to treatment plan documented above with patient. I answered all patient questions related to this plan, and patient expressed understanding and agreement.   continue Lexapro 10 mg; increase Abilify 400 mg (injection) monthly; add short-term Xanax 0.25 mg bid #28 (2 weeks)  · continue counseling   · Message regarding anxiety in a couple of weeks    Medication List with Changes/Refills   New Medications    ALPRAZOLAM (XANAX) 0.25 MG TABLET    Take 1 tablet (0.25 mg total) by mouth 2 (two) times daily as needed for Anxiety.   Current Medications    ARIPIPRAZOLE (ABILIFY MAINTENA) 400 MG SERS SYRINGE    Inject 400 mg into the muscle every 28 days.    ESCITALOPRAM OXALATE (LEXAPRO) 10 MG TABLET    Take 1 tablet (10 mg total) by mouth once daily.        Return to Clinic: 2 months      Time spent with pt including note preparation: 25 minutes       Milli Medina, PhD, MP  Advanced Practice Medical Psychologist  [facility]  [address]  [place] [address]  [phone]   [phone] fax

## 2022-05-13 NOTE — PROGRESS NOTES
5/13/22 8:30 am      Abilify Maintena 400mg  NDC NUMBER: [id number]  LOT NUMBER: [id number]  EXP DATE:         Abilify Maintena 400mg      Inject 400mg  into the muscle every 28 days.    Injection was to be give in the left dosrsalgluteal.  Patient was okay to give the injection into the deltoid.     Administered Abilify Maintena 300mg into LEFT deltoid        Patient was instructed to wait 15 minutes for any reaction to medication  Patient did wait the 15 minutes   Patient left at  8:32 am with no reaction to medication  Patient stated she would okay, she gets the all time.

## 2022-05-23 ENCOUNTER — PATIENT MESSAGE (OUTPATIENT)
Dept: PSYCHIATRY | Facility: CLINIC | Age: 22
End: 2022-05-23
Payer: MEDICAID

## 2022-05-24 ENCOUNTER — PATIENT MESSAGE (OUTPATIENT)
Dept: PSYCHIATRY | Facility: CLINIC | Age: 22
End: 2022-05-24
Payer: MEDICAID

## 2022-05-25 ENCOUNTER — OFFICE VISIT (OUTPATIENT)
Dept: PSYCHIATRY | Facility: CLINIC | Age: 22
End: 2022-05-25
Payer: MEDICAID

## 2022-05-25 DIAGNOSIS — F31.62 MIXED BIPOLAR AFFECTIVE DISORDER, MODERATE: Primary | ICD-10-CM

## 2022-05-25 DIAGNOSIS — F41.1 GENERALIZED ANXIETY DISORDER: ICD-10-CM

## 2022-05-25 PROCEDURE — 90837 PR PSYCHOTHERAPY W/PATIENT, 60 MIN: ICD-10-PCS | Mod: AJ,HA,95, | Performed by: SOCIAL WORKER

## 2022-05-25 PROCEDURE — 90837 PSYTX W PT 60 MINUTES: CPT | Mod: AJ,HA,95, | Performed by: SOCIAL WORKER
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
## 2022-05-26 ENCOUNTER — TELEPHONE (OUTPATIENT)
Dept: PSYCHIATRY | Facility: CLINIC | Age: 22
End: 2022-05-26
Payer: MEDICAID

## 2022-05-30 ENCOUNTER — OFFICE VISIT (OUTPATIENT)
Dept: PSYCHIATRY | Facility: CLINIC | Age: 22
End: 2022-05-30
Payer: MEDICAID

## 2022-05-30 ENCOUNTER — PATIENT MESSAGE (OUTPATIENT)
Dept: PSYCHIATRY | Facility: CLINIC | Age: 22
End: 2022-05-30

## 2022-05-30 DIAGNOSIS — F31.62 MIXED BIPOLAR AFFECTIVE DISORDER, MODERATE: Primary | ICD-10-CM

## 2022-05-30 DIAGNOSIS — F41.1 GENERALIZED ANXIETY DISORDER: ICD-10-CM

## 2022-05-30 PROCEDURE — 90834 PR PSYCHOTHERAPY W/PATIENT, 45 MIN: ICD-10-PCS | Mod: AJ,HA,95, | Performed by: SOCIAL WORKER

## 2022-05-30 PROCEDURE — 90834 PSYTX W PT 45 MINUTES: CPT | Mod: AJ,HA,95, | Performed by: SOCIAL WORKER

## 2022-05-30 NOTE — PROGRESS NOTES
Individual Psychotherapy Follow-up Visit Progress Note (PhD/LCSW)     Outpatient Psychotherapy - 45 minutes with patient (38-52 minutes) - [id number]    Date: 05/30/2022    Visit Type: Telehealth    Due to the nature of this visit type, a virtual visit with synchronous audio and video, each patient to whom this provider administers behavioral health services by telemedicine is: (1) informed of the relationship between the provider and patient and the respective role of any other health care provider with respect to management of the patient; and (2) notified that he or she may decline to receive services by telemedicine and may withdraw from such care at any time.    The patient was informed of the following:     Provider's contact info:  [facility]  [address] [place] [address], [address] Floor, [address]  [place] [address]  (Phone) [phone]    If technology issues occur, call office phone: Ph: [phone]  If crisis: Dial 911 or go to nearest Emergency Room (ER)  If questions related to privacy practices: contact [facility] Information Department: [phone]    For security purposes, the pt identified that they were at [address] [place] [address] during today's session and contact number is [phone] (home) .    The pt's emergency contact(s) is Extended Emergency Contact Information  Primary Emergency Contact: shelia steen  Address: [address] [facility] [place] [address] [facility]  Home Phone: [phone]  Mobile Phone: [phone]  Relation: Mother  Secondary Emergency Contact: Umer Fernandez  Address: [address] [place], [place] [address] [place] [address]  Home Phone: [phone]  Mobile Phone: [phone]  Relation: Father.    Crisis Disclaimer: Patient was informed that due to the virtual nature of the visit, that if a crisis develops, protocols will be implemented to ensure patient safety, including  "but not limited to: 1) Initiating a welfare check with local law enforcement and/or 2) Calling 911    5/30/2022  MRN: [id number]  Primary Care Provider: Bessy Bryant MD    Mahamed Fernandez is a 21 y.o. female who presents today for follow-up of mood disorder and anxiety. Met with patient.      Preferred Name: Mahamed     Subjective:       Content of Current Session: Pt reported, "I'm doing ok" upon entry to this session. LCSW utilized active listening/reflection to engage with pt and review experiences since their last session with this provider. Pt reported she had been feeling better since her last session with this provider. LCSW utilized cognitive processing and supportive therapy. Pt reported she had utilized her coping skills since her last session with this provider and noted a positive shift in her moods as a result. Pt reported she had utilized her communication skills to convey her feelings/expectations with her boyfriend relative to their breakup. Pt reported they were able to reconcile due to areas of cited miscommunication noted through the utilization of her communication skills.  Pt reported she had also noticed a decrease in anxiety secondary to her decision to find a new place of employment. 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  Pt reported her primary issue to date has been struggling with broken sleep. Pt reported waking up through the night 2-3 times due to nightmares. LCSW discussed with pt her dreams and utilized reality orientation to foster pt's objectivity. LCSW and pt reviewed her sleep hygeine. LCSW discussed utilization of journalling, positive affirmations, decreased screen time, and censoring the content consumed prior to sleeping as coping skills to support the quality of her sleep. LCSW and pt reviewed her copng skills. Pt reported her psychiatric experiences since her last session with this provider were " otherwise unremarkable. Pt requested to end session early due to a conflicting appointment time. LCSW and pt briefly reviewed her coping skills. LCSW encouraged their application for review in upcoming session with this provider. Pt responded appropriately to aforementioned interventions. Pt denied SI/HI/AVH.     Therapeutic Interventions Utilized During Current Session: Cognitive Processing Therapy, Cognitive Behavioral Therapy, Reality Therapy, Supportive Therapy    GAD7 5/30/2022 5/25/2022 4/12/2022 3/29/2022 1/31/2022 1/10/2022 7/19/2021   1. Feeling nervous, anxious, or on edge? 1 1 0 0 2 2 2   2. Not being able to stop or control worrying? 1 1 2 0 2 1 2   3. Worrying too much about different things? 1 1 2 0 2 1 2   4. Trouble relaxing? 1 1 2 0 2 2 2   5. Being so restless that it is hard to sit still? 1 1 0 0 0 0 0   6. Becoming easily annoyed or irritable? 1 1 0 0 2 1 0   7. Feeling afraid as if something awful might happen? 1 1 0 0 0 0 0   8. If you checked off any problems, how difficult have these problems made it for you to do your work, take care of things at home, or get along with other people? - - - - - - 1   VIELKA-7 Score 7 7 6 0 10 7 8      0-4 = Minimal anxiety  5-9 = Mild anxiety  10-14 = Moderate anxiety  15-21 = Severe anxiety     PHQ-9 Depression Patient Health Questionnaire 5/30/2022 5/25/2022 3/29/2022 1/10/2022 7/19/2021 6/7/2021 2/17/2021   Patient agreed to terms: Yes Yes Yes Yes Yes - -   Little interest or pleasure in doing things 1 0 0 0 0 0 0   Feeling down, depressed, or hopeless 1 0 0 0 0 1 0   Trouble falling or staying asleep, or sleeping too much 1 3 0 0 0 0 0   Feeling tired or having little energy 1 0 0 0 0 0 0   Poor appetite or overeating 1 3 0 0 0 0 0   Feeling bad about yourself - or that you are a failure or have let yourself or your family down 1 0 0 0 2 0 0   Trouble concentrating on things, such as reading the newspaper or watching television 1 0 0 0 2 0 0   Moving or  speaking so slowly that other people could have noticed. Or the opposite - being so fidgety or restless that you have been moving around a lot more than usual 1 0 0 0 0 0 0   Thoughts that you would be better off dead, or of hurting yourself in some way 1 0 0 0 0 - -   PHQ-9 Total Score 9 6 0 0 4 - -   If you checked off any problems, how difficult have these problems made it for you to do your work, take care of things at home, or get along with other people? Somewhat difficult Somewhat difficult Not difficult at all Not difficult at all Not difficult at all - -   Interpretation Mild Mild Minimal or None Minimal or None - - -     0-4 = No intervention  5 to 9 = Mild  10 to 14 = Moderate  15 to 19 = Moderately severe  ?20 = Severe      Objective:       Mental Status Evaluation  Appearance: unremarkable, age appropriate  Behavior: normal, cooperative  Speech: normal tone, normal rate, normal pitch, normal volume  Mood: euthymic  Affect: congruent and appropriate  Thought Process: normal and logical  Thought Content: normal, no suicidality, no homicidality, delusions, or paranoia  Sensorium: grossly intact  Cognition: grossly intact  Insight: intact  Judgment: adequate to circumstances    Risk parameters:  Patient reports no suicidal ideation  Patient reports no homicidal ideation  Patient reports no self-injurious behavior  Patient reports no violent behavior      Assessment & Plan:     The patient's response to the interventions is accepting    The patient's progress toward treatment goals is fair     Homework assigned: none     Treatment plan:   A. Target symptoms: Anxiety and Mood Disorder   B. Therapeutic modalities: insight oriented psychotherapy, behavior modifying psychotherapy, supportive psychotherapy  C. Why chosen therapy is appropriate versus another modality: relevant to diagnosis, patient responds to this modality, evidence based practice   D. Outcome monitoring methods: self report, observation, rating  scales, feedback from clinical staff      Visit Diagnosis:   1. Mixed bipolar affective disorder, moderate    2. Generalized anxiety disorder        Follow-up: individual psychotherapy    Return to Clinic: 2 weeks, 3 weeks  Pt Reported to Schedule Self via Epic EMR MyChart Application and/or Department Support Staff          Shruti Cox LCSW  05/30/2022   10:06 AM

## 2022-05-31 ENCOUNTER — PATIENT MESSAGE (OUTPATIENT)
Dept: PSYCHIATRY | Facility: CLINIC | Age: 22
End: 2022-05-31
Payer: MEDICAID

## 2022-06-15 ENCOUNTER — OFFICE VISIT (OUTPATIENT)
Dept: PSYCHIATRY | Facility: CLINIC | Age: 22
End: 2022-06-15
Payer: MEDICAID

## 2022-06-15 ENCOUNTER — CLINICAL SUPPORT (OUTPATIENT)
Dept: PSYCHIATRY | Facility: CLINIC | Age: 22
End: 2022-06-15
Payer: MEDICAID

## 2022-06-15 VITALS
WEIGHT: 121.06 LBS | SYSTOLIC BLOOD PRESSURE: 105 MMHG | BODY MASS INDEX: 21.45 KG/M2 | HEIGHT: 63 IN | DIASTOLIC BLOOD PRESSURE: 71 MMHG | HEART RATE: 80 BPM

## 2022-06-15 DIAGNOSIS — F41.1 GENERALIZED ANXIETY DISORDER: ICD-10-CM

## 2022-06-15 DIAGNOSIS — F31.62 MIXED BIPOLAR AFFECTIVE DISORDER, MODERATE: Primary | ICD-10-CM

## 2022-06-15 PROCEDURE — 3074F PR MOST RECENT SYSTOLIC BLOOD PRESSURE < 130 MM HG: ICD-10-PCS | Mod: HP,HA,CPTII, | Performed by: PSYCHOLOGIST

## 2022-06-15 PROCEDURE — 99212 OFFICE O/P EST SF 10 MIN: CPT | Mod: PBBFAC | Performed by: PSYCHOLOGIST

## 2022-06-15 PROCEDURE — 99214 OFFICE O/P EST MOD 30 MIN: CPT | Mod: HP,HA,S$PBB, | Performed by: PSYCHOLOGIST

## 2022-06-15 PROCEDURE — 99999 PR PBB SHADOW E&M-EST. PATIENT-LVL I: CPT | Mod: PBBFAC,HA,,

## 2022-06-15 PROCEDURE — 99999 PR PBB SHADOW E&M-EST. PATIENT-LVL I: ICD-10-PCS | Mod: PBBFAC,HA,,

## 2022-06-15 PROCEDURE — 99211 OFF/OP EST MAY X REQ PHY/QHP: CPT | Mod: PBBFAC

## 2022-06-15 PROCEDURE — 99214 PR OFFICE/OUTPT VISIT, EST, LEVL IV, 30-39 MIN: ICD-10-PCS | Mod: HP,HA,S$PBB, | Performed by: PSYCHOLOGIST

## 2022-06-15 PROCEDURE — 99999 PR PBB SHADOW E&M-EST. PATIENT-LVL II: CPT | Mod: PBBFAC,HA,, | Performed by: PSYCHOLOGIST

## 2022-06-15 PROCEDURE — 3074F SYST BP LT 130 MM HG: CPT | Mod: HP,HA,CPTII, | Performed by: PSYCHOLOGIST

## 2022-06-15 PROCEDURE — 3078F PR MOST RECENT DIASTOLIC BLOOD PRESSURE < 80 MM HG: ICD-10-PCS | Mod: HP,HA,CPTII, | Performed by: PSYCHOLOGIST

## 2022-06-15 PROCEDURE — 3008F PR BODY MASS INDEX (BMI) DOCUMENTED: ICD-10-PCS | Mod: HP,HA,CPTII, | Performed by: PSYCHOLOGIST

## 2022-06-15 PROCEDURE — 3078F DIAST BP <80 MM HG: CPT | Mod: HP,HA,CPTII, | Performed by: PSYCHOLOGIST

## 2022-06-15 PROCEDURE — 99999 PR PBB SHADOW E&M-EST. PATIENT-LVL II: ICD-10-PCS | Mod: PBBFAC,HA,, | Performed by: PSYCHOLOGIST

## 2022-06-15 PROCEDURE — 3008F BODY MASS INDEX DOCD: CPT | Mod: HP,HA,CPTII, | Performed by: PSYCHOLOGIST

## 2022-06-15 RX ORDER — DOXEPIN HYDROCHLORIDE 10 MG/1
10 CAPSULE ORAL NIGHTLY
Qty: 30 CAPSULE | Refills: 2 | Status: SHIPPED | OUTPATIENT
Start: 2022-06-15 | End: 2022-10-17

## 2022-06-15 RX ORDER — ESCITALOPRAM OXALATE 10 MG/1
10 TABLET ORAL DAILY
Qty: 30 TABLET | Refills: 2 | Status: SHIPPED | OUTPATIENT
Start: 2022-06-15 | End: 2022-09-22 | Stop reason: ALTCHOICE

## 2022-06-15 RX ADMIN — ARIPIPRAZOLE 400 MG: KIT at 09:06

## 2022-06-15 NOTE — PROGRESS NOTES
6/15/22 9:45 am      Abilify Maintena 400mg  NDC NUMBER: 37255-627-81  LOT NUMBER: [id number]  EXP DATE: 08/2024        Abilify Maintena 400mg      Inject 400mg  into the muscle every 28 days.     Injection was to be give in the left dosrsalgluteal.  Patient was okay to give the injection into the deltoid.      Administered Abilify Maintena 300mg into LEFT dorsalgluteal        Patient was instructed to wait 15 minutes for any reaction to medication  Patient did wait the 15 minutes   Patient left at  9:48 am with no reaction to medication  Patient stated she would okay, she gets the all time.

## 2022-06-15 NOTE — PROGRESS NOTES
"Outpatient Psychiatry Follow-Up Visit     6/15/2022      Clinical Status of Patient:  Outpatient (Ambulatory)    Chief Complaint:  Mahamed Fernandez is a 21 y.o. female who presents today for follow-up of mood and anxiety.      Impressions/Plan from last visit: Mahamed attended her virtual visit. Since we last met, Mahamed reported increased anxiety. We added Lexapro. We also discussed increasing her Abilify dosage to 400 mg. She has been highly anxious--she started working and is nervous about it and vomiting, often shaking. She is highly nervous today, too--crying some when discussing this--we discussed a short-term use of Xanax until her Lexapro becomes effective. We agreed that she would try 1/2 tab of 0.25 mg initially to see how it works. We will meet again in a couple of months in the clinic when it's time for her to get her injection if possible.  Medicines--continue Lexapro 10 mg; increase Abilify 400 mg (injection) monthly; add short-term Xanax 0.25 mg bid #28 (2 weeks).    Interval History and Content of Current Session: Mahamed attended her visit. She said that things have "been all over the place." She believes that she is doing well overall--she is having trouble with sleep. She said that she wakes up frequently during the night. She reported that she thinks about "deep thoughts"--heavy and depressing. She also reportedly zones out like this, too, at work--thinking "deep thoughts." As we talked, she said that this happens more during the day--she stares into outer space. She said that she may have a song playing in her head and then will think "what ifs" about "what if I get fired" or "what if I get into another wreck." We discussed anxiety in general and attacking "panic" through calming her body physiologically and cognitively through what she says to herself during those times. We also talked about sleep hygiene--after 30 minutes, doing something that keeps her brain busy but is not overly distracting " (like [place] or word find). Her mom had reportedly asked about Vistaril, but there is a potential drug-drug interaction with Lexapro. We agreed on a trial of doxepin to help with sleep.    Medicines--continue Lexapro 10 mg; Abilify 400 mg (injection) monthly; stop Xanax (use only as needed but no additional refills); add doxepin 10 mg hs.     since March 2022.          GAD7 5/30/2022 5/25/2022 4/12/2022   1. Feeling nervous, anxious, or on edge? 1 1 0   2. Not being able to stop or control worrying? 1 1 2   3. Worrying too much about different things? 1 1 2   4. Trouble relaxing? 1 1 2   5. Being so restless that it is hard to sit still? 1 1 0   6. Becoming easily annoyed or irritable? 1 1 0   7. Feeling afraid as if something awful might happen? 1 1 0   8. If you checked off any problems, how difficult have these problems made it for you to do your work, take care of things at home, or get along with other people? - - -   VIELKA-7 Score 7 7 6      0-4 = Minimal anxiety  5-9 = Mild anxiety  10-14 = Moderate anxiety  15-21 = Severe anxiety       Review of Systems   · PSYCHIATRIC: Pertinant items are noted in the narrative.    Past Medical, Family and Social History: The patient's past medical, family and social history have been reviewed and updated as appropriate within the electronic medical record - see encounter notes.      Current Outpatient Medications:     ARIPiprazole (ABILIFY MAINTENA) 400 mg sers syringe, Inject 400 mg into the muscle every 28 days., Disp: 1 each, Rfl: 11    doxepin (SINEQUAN) 10 MG capsule, Take 1 capsule (10 mg total) by mouth every evening., Disp: 30 capsule, Rfl: 2    EScitalopram oxalate (LEXAPRO) 10 MG tablet, Take 1 tablet (10 mg total) by mouth once daily., Disp: 30 tablet, Rfl: 2    Current Facility-Administered Medications:     aripiprazole (ABILIFY MAINTENA) 400 mg SERS syringe, 400 mg, Intramuscular, 1 time in Clinic/HOD, Milli Medina, PhD, MPAP    levonorgestreL 14 mcg/24  "hrs (3 yrs) 13.5 mg IUD 14 mcg, 14 mcg, Intrauterine, , Arina Ruiz MD, 14 mcg at 03/29/21 1300    Compliance: yes    Side effects: None    Risk Parameters:  Patient reports no suicidal ideation  Patient reports no homicidal ideation  Patient reports no self-injurious behavior  Patient reports no violent behavior    Exam (detailed: at least 9 elements; comprehensive: all 15 elements)   Constitutional  Vitals:  Most recent vital signs were reviewed.   Last 3 sets of Vitals    Vitals - 1 value per visit 3/29/2021 3/15/2022 6/15/2022   SYSTOLIC 108 110 105   DIASTOLIC 74 71 71   Pulse - 80 80   Temp - - -   Resp - - -   SPO2 - - -   Weight (lb) 130.95 121.91 121.03   Weight (kg) 59.4 55.3 54.9   Height 62.5 - 62.5   BMI (Calculated) 23.6 - 21.8   VISIT REPORT - - -   Pain Score  - - -          General:  age appropriate, casually dressed, neatly groomed, wearing mask     Musculoskeletal  Muscle Strength/Tone:  no tremor, no tic   Gait & Station:  non-ataxic     Psychiatric  Speech:  no latency; no press   Behavior: wnl   Mood & Affect:  "I don't know. Very---in the middle. I'm okay--just --" "a little ramped up"  congruent and appropriate   Thought Process:  normal and logical   Associations:  intact   Thought Content:  normal, no suicidality, no homicidality, delusions, or paranoia   Insight:  has awareness of illness   Judgement: behavior is adequate to circumstances   Orientation:  grossly intact   Memory: intact for content of interview   Language: grossly intact   Attention Span & Concentration:  Grossly intact   Fund of Knowledge:  intact and appropriate to age and level of education     Assessment and Diagnosis   Status/Progress: Based on the examination today, the patient's problem(s) is/are improved and adequately but not ideally controlled.  New problems have been presented today.   Co-morbidities are complicating management of the primary condition.  There are no active rule-out diagnoses for this " patient at this time.     General Impression:     Encounter Diagnoses   Name Primary?    Mixed bipolar affective disorder, moderate Yes    Generalized anxiety disorder          Intervention/Counseling/Treatment Plan   · Medication Management: Discussed risks, benefits, and alternatives to treatment plan documented above with patient. I answered all patient questions related to this plan, and patient expressed understanding and agreement.   continue Lexapro 10 mg; Abilify 400 mg (injection) monthly; stop Xanax (use only as needed but no additional refills); add doxepin 10 mg hs  · continue therapy   · Sleep hygiene    Return to Clinic: 3 months    Medication List with Changes/Refills   New Medications    DOXEPIN (SINEQUAN) 10 MG CAPSULE    Take 1 capsule (10 mg total) by mouth every evening.   Current Medications    ARIPIPRAZOLE (ABILIFY MAINTENA) 400 MG SERS SYRINGE    Inject 400 mg into the muscle every 28 days.   Changed and/or Refilled Medications    Modified Medication Previous Medication    ESCITALOPRAM OXALATE (LEXAPRO) 10 MG TABLET EScitalopram oxalate (LEXAPRO) 10 MG tablet       Take 1 tablet (10 mg total) by mouth once daily.    Take 1 tablet (10 mg total) by mouth once daily.   Discontinued Medications    ALPRAZOLAM (XANAX) 0.25 MG TABLET    Take 1 tablet (0.25 mg total) by mouth 2 (two) times daily as needed for Anxiety.        Time spent with pt including note preparation: 31 minutes     Milli Medina, PhD, MP  Advanced Practice Medical Psychologist  [facility]  [address] [place] [address].  [place] [id number]  [phone]   [phone] fax

## 2022-06-15 NOTE — PATIENT INSTRUCTIONS
"[facility] DEPARTMENT OF PSYCHIATRY   PATIENT INFORMATION    We appreciate the opportunity to participate in your medical care and hope the following protocols will make it easier for you to receive quality treatment in our department.    PUNCTUALITY: Your appointment is scheduled for a fixed amount of time, reserved especially for you.  To get the benefit of your appointment, please arrive at least 15 minutes early to allow time for traffic, parking and registration.  Should you arrive more than 15 minutes late to your appointment, you will be rescheduled in order to assure your clinician has adequate time to assess you and provide helpful care.      APPOINTMENTS: Appointments are made by the nursing/front office staff or through the patient portal. Providers do not have access  to schedule appointments. Walk in appointments are not available. FOR EMERGENCIES, PLEASE GO THE CLOSEST EMERGENCY ROOM.    CANCELLATION/MISSED APPOINTMENTS:   In order to receive quality care, all appointments must be kept.  If you are unable to keep an appointment, please reschedule at least 3 days prior if possible. Late cancellations (within 24 hours of the appointment) and repeated no-show appointments may result in dismissal from the clinic. After two no show/late cancellation visits, you will receive a notice letter, alerting you to keep visits to prevent department dismissal. If another visit is missed after receipt of the notice, you will be discharged from the clinic. This policy is in effect to allow for other individuals on a long waiting list to be seen as soon as possible. Unlike other branches of medicine where several individuals can be scheduled in a 30 minute time slot, only one individual can be scheduled in any time slot in Psychiatry.     MESSAGES: For simple questions/concerns, you may contact your individual providers electronically through the "[facility]" portal or by calling [phone] " with messages relayed via office staff. If relevant, include pharmacy name and phone number, date of last visit and next scheduled visit, phone number where you can be reached throughout the day, and whether leaving a voicemail or message on an answering machine is acceptable. Messages will be returned by the Medical Assistant or Office Staff after your provider has reviewed the message.  Please allow 24 hours for a returned message before leaving another message. Messages will be checked each workday (Monday through Friday) during office hours (8:00 a.m. and 5:00 p.m.) and returned at most within one business day.  You may leave a non-urgent message after hours. Note that psychotherapy and medication management are not appropriate by telephone or the patient portal.    PRESCRIPTION REFILLS:  Please communicate with your prescriber about any refills you need during your appointment. You may also request refills through the [facility] portal (preferred) or by calling the clinic. Prescriptions will be filled during office hours.     Please do not wait until you are completely out of medication to request refills. Same day refills are not always possible. Patients may experience symptoms of withdrawal if they run out of medications. The patient assumes all responsibility when there is an issue with non-compliance with follow-up appointments and medications.  Some medications are controlled and regulated by the FDA and ROX. Some of these medications can not be refilled before 30 days and require a face to face appointment.     PAPERWORK REQUESTS: If you have any forms or letters that need to be completed by your doctor, please present these at the beginning of the appointment to ensure that information needed to complete them is obtained during the office visit. Paperwork will be returned within 7-10 business days. Staff will call you to  the paperwork when completed.    SPECIAL EVALUATIONS: Please note that our  "department is treatment-focused. As such, we focus on treatment-oriented evaluations and do not perform specialty or "forensic" evaluations. Examples are listed below.    Disability: We do not do disability evaluations.  Please contact Social Security Administration for evaluations and determinations. You will then sign releases allowing for records from your treatment providers to be forwarded to Social Security Administration to use in their evaluation.  Gun Permit: We do not offer Sound Judgment Evaluations or assessments leading to gun ownership, nor do we fill out or file paperwork relevant to owning, concealing or purchasing a firearm.  Emotional Support     Animals (IAN): We do not provide documentation, including letters, to aid in the acclamation that an Emotional Support Animal is required. Note that ESAs are not trained to perform tasks or recognize particular signs or symptoms. Rather, they are distinguished by the close, emotional, and supportive bond between the animal and the owner.       SAMPLES: We do not provide samples of any medications. If you have financial difficulties and are on a limited income, you may qualify for Patient Assistance Programs from various pharmaceutical companies. This will require that you complete paperwork with your financial information, but this does not guarantee that the company will approve the application. Alternative medication options can be discussed.    REFERRALS/COORDINATION: You will be referred to other providers if we feel unable to adequately diagnose or treat your particular condition, or if collaboration with another provider would allow for better management of your condition.    This document is for information purposes only. Please refer to the full disclaimer and copyright statement available at http://www.cci.health.wa.gov.au regarding the information from this website before making use of such information.  See website www.cci.health.wa.gov.au for " more handouts and resources.    What is Sleep Hygiene?  Sleep hygiene is the term used to describe good sleep habits. Considerable research has gone into developing a set of guidelines and tips which are designed to enhance good sleeping, and there is much evidence to suggest that these strategies can provide long-term solutions to sleep difficulties. There are many medications which are used to treat insomnia,  but these tend to be only effective in the short-term. Ongoing use of sleeping pills may lead to dependence and interfere with developing good sleep habits independent of medication,  thereby prolonging sleep difficulties. Talk to your health professional about what is right for you, but we recommend good sleep hygiene as an important part of treating insomnia,  either with other strategies such as medication or cognitive therapy or alone.    Sleep Hygiene Tips  1) Get regular. One of the best ways to train your body to sleep well is to go to bed and get up at more or less the same time every day, even on weekends and days off! This regular rhythm will make you feel better and will give your body something to work from.  2) Sleep when sleepy. Only try to sleep when you actually feel tired or sleepy, rather than spending too much time awake in bed.  3) Get up & try again. If you havent been able to get to sleep after about 20 minutes or more, get up and do something calming or boring until you feel sleepy, then return to bed and try again. Sit quietly on the couch with the lights off (bright light will tell your brain that it is time to wake up), or read something boring like the phone book. Avoid doing anything that is too stimulating or interesting, as this will wake you up even more.  4) Avoid caffeine & nicotine. It is best to avoid consuming any caffeine (in coffee, tea, cola drinks, chocolate, and some medications) or nicotine (cigarettes) for at least 4-6 hours before going to bed. These substances  act as stimulants and interfere with the ability to fall asleep   5) Avoid alcohol. It is also best to avoid alcohol for at least 4-6 hours before going to bed. Many people believe that alcohol is relaxing and helps them to get to sleep at first, but it actually interrupts the quality of sleep.  6) Bed is for sleeping. Try not to use your bed for anything other than sleeping and sex, so that your body comes to associate bed with sleep. If you use bed as a place to watch TV, eat, read, work on your laptop, pay bills, and other things, your body will not learn this connection.  7) No naps. It is best to avoid taking naps during the day, to make sure that you are tired at bedtime. If you cant make it through the day without a nap, make sure it is for less than an hour and before 3pm.  8) Sleep rituals. You can develop your own rituals of things to remind your body that it is time to sleep - some people find it useful to do relaxing stretches or breathing exercises for 15 minutes before bed each night, or sit calmly with a cup of caffeine-free tea.  9) Bathtime. Having a hot bath 1-2 hours before bedtime can be useful, as it will raise your body temperature, causing you to feel sleepy as your body temperature drops again. Research shows that sleepiness is associated with a drop in body temperature.  10) No clock-watching. Many people who struggle with sleep tend to watch the clock too much. Frequently checking the clock during the night can wake you up (especially if you turn  on the light to read the time) and reinforces negative thoughts such as Oh no, look how late it is, Ill never get to sleep or its so early, I have only slept for 5 hours, this is  terrible.  11) Use a sleep diary. This worksheet can be a useful way of making sure you have the right facts about your sleep, rather than making assumptions. Because a diary involves watching  the clock (see point 10) it is a good idea to only use it for two  weeks to get an idea of what is going and then perhaps two months down the track to see how you are progressing.  12) Exercise. Regular exercise is a good idea to help with good sleep, but try not to do strenuous exercise in the 4 hours before bedtime. Morning walks are a great way to start the day feeling refreshed!  13) Eat right. A healthy, balanced diet will help you to sleep well, but timing is important. Some people find that a very empty stomach at bedtime is distracting, so it can be useful  to have a light snack, but a heavy meal soon before bed can also interrupt sleep. Some people recommend a warm glass of milk, which contains tryptophan, which acts as a natural  sleep inducer.  14) The right space. It is very important that your bed and bedroom are quiet and comfortable for sleeping. A cooler room with enough blankets to stay warm is best, and make sure you have curtains or an eyemask to block out early morning light and earplugs if there is noise outside your room.  15) Keep daytime routine the same. Even if you have a bad night sleep and are tired it is important that you try to keep your daytime activities the same as you had planned. That is,  dont avoid activities because you feel tired. This can reinforce the insomnia.      Call In if problems  Call Report Side Effects   Encouraged to follow up with primary care / Gen Med MD for continued monitoring of general health and wellness  Call 344 Or go to ER if Acute Concerns (especially if any thoughts of harm to self or other)          Milli Medina, PhD, MP  Advanced Practice Medical Psychologist  [facility]  [place] [id number] [phone]   [phone] fax

## 2022-07-06 ENCOUNTER — PATIENT MESSAGE (OUTPATIENT)
Dept: PSYCHIATRY | Facility: CLINIC | Age: 22
End: 2022-07-06
Payer: MEDICAID

## 2022-07-11 ENCOUNTER — PATIENT MESSAGE (OUTPATIENT)
Dept: PSYCHIATRY | Facility: CLINIC | Age: 22
End: 2022-07-11
Payer: MEDICAID
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
## 2022-07-13 ENCOUNTER — CLINICAL SUPPORT (OUTPATIENT)
Dept: PSYCHIATRY | Facility: CLINIC | Age: 22
End: 2022-07-13
Payer: MEDICAID

## 2022-07-13 ENCOUNTER — OFFICE VISIT (OUTPATIENT)
Dept: PSYCHIATRY | Facility: CLINIC | Age: 22
End: 2022-07-13
Payer: MEDICAID

## 2022-07-13 DIAGNOSIS — F31.62 MIXED BIPOLAR AFFECTIVE DISORDER, MODERATE: Primary | ICD-10-CM

## 2022-07-13 DIAGNOSIS — F41.1 GENERALIZED ANXIETY DISORDER: ICD-10-CM

## 2022-07-13 PROCEDURE — 99999 PR PBB SHADOW E&M-EST. PATIENT-LVL I: CPT | Mod: PBBFAC,HA,,

## 2022-07-13 PROCEDURE — 99211 OFF/OP EST MAY X REQ PHY/QHP: CPT | Mod: PBBFAC

## 2022-07-13 PROCEDURE — 99999 PR PBB SHADOW E&M-EST. PATIENT-LVL I: ICD-10-PCS | Mod: PBBFAC,HA,,

## 2022-07-13 PROCEDURE — 90834 PSYTX W PT 45 MINUTES: CPT | Mod: AJ,HA,95, | Performed by: SOCIAL WORKER

## 2022-07-13 PROCEDURE — 90834 PR PSYCHOTHERAPY W/PATIENT, 45 MIN: ICD-10-PCS | Mod: AJ,HA,95, | Performed by: SOCIAL WORKER

## 2022-07-13 RX ADMIN — ARIPIPRAZOLE 400 MG: KIT at 11:07

## 2022-07-13 NOTE — PROGRESS NOTES
I'm covering for Dr. Medina who is out of office. Patient has scheduled Maintena injection appt. I reviewed chart and ordered the clinic administered med.    Migue Hendrickson MD  [facility]

## 2022-07-13 NOTE — PROGRESS NOTES
6/15/22 11:23 am      Abilify Maintena 400mg  NDC NUMBER: [id number]  LOT NUMBER: [id number]  EXP DATE: 08/2024        Abilify Maintena 400mg      Inject 400mg  into the muscle every 28 days.     Injection was to be give in the left dosrsalgluteal.  Patient was okay to give the injection into the deltoid.      Administered Abilify Maintena 300mg into RIGHT dorsalgluteal        Patient was instructed to wait 15 minutes for any reaction to medication  Patient did wait the 15 minutes   Patient left at  11:25 am with no reaction to medication  Patient stated she would okay, she gets the all time.

## 2022-08-10 ENCOUNTER — PATIENT MESSAGE (OUTPATIENT)
Dept: PSYCHIATRY | Facility: CLINIC | Age: 22
End: 2022-08-10
Payer: MEDICAID

## 2022-08-11 DIAGNOSIS — F31.62 MIXED BIPOLAR AFFECTIVE DISORDER, MODERATE: Primary | ICD-10-CM

## 2022-08-15 ENCOUNTER — CLINICAL SUPPORT (OUTPATIENT)
Dept: PSYCHIATRY | Facility: CLINIC | Age: 22
End: 2022-08-15
Payer: MEDICAID

## 2022-08-15 PROCEDURE — 99211 OFF/OP EST MAY X REQ PHY/QHP: CPT | Mod: PBBFAC

## 2022-08-15 PROCEDURE — 99999 PR PBB SHADOW E&M-EST. PATIENT-LVL I: CPT | Mod: PBBFAC,HA,,

## 2022-08-15 PROCEDURE — 99999 PR PBB SHADOW E&M-EST. PATIENT-LVL I: ICD-10-PCS | Mod: PBBFAC,HA,,

## 2022-08-15 RX ADMIN — ARIPIPRAZOLE 400 MG: KIT at 11:08

## 2022-08-15 NOTE — PROGRESS NOTES
Abilify Maintena 400mg  NDC NUMBER: [id number]  LOT NUMBER: [id number]  EXP DATE: 08/2024        Abilify Maintena 400mg ,Inject 400mg  into the muscle every 28 days.Injection was to be given in  the right dosrsalgluteal.   She tolerated well.  Patient was instructed to wait 15 minutes for any reaction to medication  Patient did wait the 15 minutes   Patient left with no reaction to medication  Patient stated she would okay, she get this shot all the all time.

## 2022-09-07 ENCOUNTER — PATIENT MESSAGE (OUTPATIENT)
Dept: PSYCHIATRY | Facility: CLINIC | Age: 22
End: 2022-09-07
Payer: MEDICAID

## 2022-09-12 ENCOUNTER — CLINICAL SUPPORT (OUTPATIENT)
Dept: PSYCHIATRY | Facility: CLINIC | Age: 22
End: 2022-09-12
Payer: MEDICAID

## 2022-09-12 DIAGNOSIS — F31.62 MIXED BIPOLAR AFFECTIVE DISORDER, MODERATE: Primary | ICD-10-CM

## 2022-09-12 PROCEDURE — 99211 OFF/OP EST MAY X REQ PHY/QHP: CPT | Mod: PBBFAC

## 2022-09-12 PROCEDURE — 99999 PR PBB SHADOW E&M-EST. PATIENT-LVL I: CPT | Mod: PBBFAC,HA,,

## 2022-09-12 PROCEDURE — 99999 PR PBB SHADOW E&M-EST. PATIENT-LVL I: ICD-10-PCS | Mod: PBBFAC,HA,,

## 2022-09-12 RX ADMIN — ARIPIPRAZOLE 400 MG: KIT at 08:09

## 2022-09-12 NOTE — PROGRESS NOTES
9/12/22 8:59 am      Abilify Maintena 400mg  NDC NUMBER: [id number]  LOT NUMBER: [id number]  EXP DATE: 10/2024        Abilify Maintena 400mg      Inject 400mg  into the muscle every 28 days.     Injection was to be give in the left dosrsalgluteal.  Patient was okay to give the injection into the deltoid.      Administered Abilify Maintena 400mg into RIGHT dorsalgluteal        Patient was instructed to wait 15 minutes for any reaction to medication  Patient did wait the 15 minutes   Patient left at  9:00 am with no reaction to medication  Patient stated she would okay, she gets the all time.

## 2022-09-19 ENCOUNTER — PATIENT MESSAGE (OUTPATIENT)
Dept: PSYCHIATRY | Facility: CLINIC | Age: 22
End: 2022-09-19
Payer: MEDICAID

## 2022-09-22 DIAGNOSIS — F41.1 GENERALIZED ANXIETY DISORDER: Primary | ICD-10-CM

## 2022-09-22 RX ORDER — FLUOXETINE HYDROCHLORIDE 20 MG/1
20 CAPSULE ORAL DAILY
Qty: 30 CAPSULE | Refills: 1 | Status: SHIPPED | OUTPATIENT
Start: 2022-09-22 | End: 2022-10-17 | Stop reason: SDUPTHER

## 2022-09-22 NOTE — PROGRESS NOTES
Individual Psychotherapy Follow-up Visit Progress Note (PhD/LCSW)     Outpatient Psychotherapy - 45 minutes with patient (38-52 minutes) - [id number]    Date: 09/26/2022    Visit Type: Telehealth    Due to the nature of this visit type, a virtual visit with synchronous audio and video, each patient to whom this provider administers behavioral health services by telemedicine is: (1) informed of the relationship between the provider and patient and the respective role of any other health care provider with respect to management of the patient; and (2) notified that he or she may decline to receive services by telemedicine and may withdraw from such care at any time.    The patient was informed of the following:     Provider's contact info:  [facility]  [address] [place] [address], 3rd Floor, [address]  [place] [address]  (Phone) [phone]    If technology issues occur, call office phone: Ph: [phone]  If crisis: Dial 911 or go to nearest Emergency Room (ER)  If questions related to privacy practices: contact [facility] Information Department: [phone]    For security purposes, the pt identified that they were at [address] [place] [address] during today's session and contact number is [phone] (home) .    The pt's emergency contact(s) is Extended Emergency Contact Information  Primary Emergency Contact: shelia steen  Address: [address] [address] [place] [address] [facility]  Home Phone: [phone]  Mobile Phone: [phone]  Relation: Mother  Secondary Emergency Contact: Umer Fernandez  Address: [address] [place], [place] [address] [place] [address]  Home Phone: [phone]  Mobile Phone: [phone]  Relation: Father.    Crisis Disclaimer: Patient was informed that due to the virtual nature of the visit, that if a crisis develops, protocols will be implemented to ensure patient safety, including  "but not limited to: 1) Initiating a welfare check with local law enforcement and/or 2) Calling 911      9/26/2022  MRN: [id number]  Primary Care Provider: Bessy Bryant MD    Mahamed Fernandez is a 22 y.o. female who presents today for follow-up of mood disorder and anxiety. Met with patient.      Preferred Name: Mahamed   Transgender Identity Form    Gender Identity Form  Gender Identity: cisgender female  Sex at Birth: female  Patient Pronouns: she/her/hers  Transition Summary         Subjective:       Content of Current Session: Pt reported, "I'm ok but my anxiety has been really bad" upon entry to this session. LCSW utilized active listening/reflection to engage with pt and review experiences since their last session with this provider. Pt reported "my anxiety was so high I thought I would have to admit myself to the hospital and I've had to leave work because it got so bad." Pt reported she no longer felt she needed to be psychiatrically hospitalized and denied SI/HI/AVH. 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 LCSW utilized reframing perspective to support pt's understanding of professionalism and asking for external support. LCSW and pt role played in session utilization of her communication/self-advocay skills in seeking external support. LCSW discussed pt's utilization of proactivity skills/planning in preemptively addressing her anxiety. LCSW discussed " pt's utilization of her mindfulness based therapy skills in proactively and reactively addressing her anxiety. LCSW discussed pt's utilization of mobile phone applications in providing her with tangible guidance of her aforementioned coping skills (LCSW provided list in Aftervisit Summary). LCSW and pt reviewed her historical coping skills. LCSW encouraged pt to share her aforementioned medication concerns with her psychiatric prescriber in their upcoming appointment - approximately 2wks per Epic EMR. LCSW utilized person centered and strengths based perspective to empower and edify pt's confidence. Pt responded appropriately to aforementioned interventions. Pt denied SI/HI/AVH.       Therapeutic Interventions Utilized During Current Session: Cognitive Processing Therapy, Cognitive Behavioral Therapy, Mindfulness-Based Cognitive Therapy, Interpersonal Psychotherapy, Person-Centered Therapy, Reality Therapy, Strength-Based Therapy, Solution Focused Therapy, Supportive Therapy    GAD7 9/26/2022 7/13/2022 5/30/2022 5/25/2022 4/12/2022 3/29/2022 1/31/2022   1. Feeling nervous, anxious, or on edge? 0 0 1 1 0 0 2   2. Not being able to stop or control worrying? 0 0 1 1 2 0 2   3. Worrying too much about different things? 0 0 1 1 2 0 2   4. Trouble relaxing? 0 0 1 1 2 0 2   5. Being so restless that it is hard to sit still? 0 0 1 1 0 0 0   6. Becoming easily annoyed or irritable? 0 0 1 1 0 0 2   7. Feeling afraid as if something awful might happen? 0 0 1 1 0 0 0   8. If you checked off any problems, how difficult have these problems made it for you to do your work, take care of things at home, or get along with other people? - - - - - - -   VIELKA-7 Score 0 0 7 7 6 0 10      0-4 = Minimal anxiety  5-9 = Mild anxiety  10-14 = Moderate anxiety  15-21 = Severe anxiety     PHQ-9 Depression Patient Health Questionnaire 9/26/2022 7/13/2022 5/30/2022 5/25/2022 3/29/2022 1/10/2022 7/19/2021   Patient agreed to terms: Yes Yes Yes Yes  Yes Yes Yes   Little interest or pleasure in doing things 0 0 1 0 0 0 0   Feeling down, depressed, or hopeless 0 0 1 0 0 0 0   Trouble falling or staying asleep, or sleeping too much 0 0 1 3 0 0 0   Feeling tired or having little energy 0 0 1 0 0 0 0   Poor appetite or overeating 0 0 1 3 0 0 0   Feeling bad about yourself - or that you are a failure or have let yourself or your family down 0 0 1 0 0 0 2   Trouble concentrating on things, such as reading the newspaper or watching television 0 0 1 0 0 0 2   Moving or speaking so slowly that other people could have noticed. Or the opposite - being so fidgety or restless that you have been moving around a lot more than usual 0 0 1 0 0 0 0   Thoughts that you would be better off dead, or of hurting yourself in some way 0 0 1 0 0 0 0   PHQ-9 Total Score 0 0 9 6 0 0 4   If you checked off any problems, how difficult have these problems made it for you to do your work, take care of things at home, or get along with other people? Not difficult at all Not difficult at all Somewhat difficult Somewhat difficult Not difficult at all Not difficult at all Not difficult at all   Interpretation Minimal or None Minimal or None Mild Mild Minimal or None Minimal or None -     0-4 = No intervention  5 to 9 = Mild  10 to 14 = Moderate  15 to 19 = Moderately severe  ?20 = Severe      Objective:       Mental Status Evaluation  Appearance: unremarkable, age appropriate  Behavior: normal, cooperative  Speech: normal tone, normal rate, normal pitch, normal volume  Mood: euthymic  Affect: congruent and appropriate  Thought Process: normal and logical  Thought Content: normal, no suicidality, no homicidality, delusions, or paranoia  Sensorium: grossly intact  Cognition: grossly intact  Insight: intact  Judgment: adequate to circumstances    Risk parameters:  Patient reports no suicidal ideation  Patient reports no homicidal ideation  Patient reports no self-injurious behavior  Patient reports  no violent behavior      Assessment & Plan:     The patient's response to the interventions is accepting    The patient's progress toward treatment goals is fair     Homework assigned: none     Treatment plan:   A. Target symptoms: Anxiety and Mood Disorder   B. Therapeutic modalities: insight oriented psychotherapy, behavior modifying psychotherapy, supportive psychotherapy  C. Why chosen therapy is appropriate versus another modality: relevant to diagnosis, patient responds to this modality, evidence based practice   D. Outcome monitoring methods: self report, observation, rating scales, feedback from clinical staff      Visit Diagnosis:   1. Mixed bipolar affective disorder, moderate    2. Generalized anxiety disorder        Follow-up: individual psychotherapy    Return to Clinic: 3 weeks  Pt Reported to Schedule Self via Epic EMR MyChart Application and/or Department Support Staff          Shruti Cox LCSW  09/26/2022   10:00 AM

## 2022-09-26 ENCOUNTER — PATIENT MESSAGE (OUTPATIENT)
Dept: PSYCHIATRY | Facility: CLINIC | Age: 22
End: 2022-09-26

## 2022-09-26 ENCOUNTER — OFFICE VISIT (OUTPATIENT)
Dept: PSYCHIATRY | Facility: CLINIC | Age: 22
End: 2022-09-26
Payer: MEDICAID

## 2022-09-26 DIAGNOSIS — F31.62 MIXED BIPOLAR AFFECTIVE DISORDER, MODERATE: Primary | ICD-10-CM

## 2022-09-26 DIAGNOSIS — F41.1 GENERALIZED ANXIETY DISORDER: ICD-10-CM

## 2022-09-26 PROCEDURE — 90834 PR PSYCHOTHERAPY W/PATIENT, 45 MIN: ICD-10-PCS | Mod: AJ,HA,95, | Performed by: SOCIAL WORKER

## 2022-09-26 PROCEDURE — 90834 PSYTX W PT 45 MINUTES: CPT | Mod: AJ,HA,95, | Performed by: SOCIAL WORKER

## 2022-09-26 NOTE — PATIENT INSTRUCTIONS
Mindfulness, Anxiety, and Depression Skills Phone Applications:   Please note, I have no professional/personal affiliation with the listed resources and I am formally disclaiming any responsibility for any experience/outcome resultant from interactions with the listed entities. All phone applications below were recommended by the [employer].     CBT Thought Diary  [id number] (an lexus designed by an agency in the [employer] to teach belly breathing)  Virtual Hope Box (an lexus produced by the [employer] that offers support in emotional regulation and stress reduction)  Headspace: Two-week free trial for the general public.  Health Minds Program Lexus: Always free. Meditation and mindfulness skills.  Calm: Seven-day free trial. A meditation, sleep, and relaxation lexus.  Stop, Breathe & Think: Always free, and for kids too.  Insight Timer: Always free. This is not a daily lexus, but rather a great library where you can search for various types of meditations and lengths by excellent teachers.  10% Happier: Free and paid options available. (Seven days free, then $99 per year.)  [facility] Lexus: Free. Meditation by Maite Quiles.  Mindfulness : Mindfulness  2.0 was developed to help veterans, service members, and others learn how to practice mindfulness. The lexus provides a gradual, self-guided training program designed to help you understand and adopt a simple mindfulness practice.  CBT-i : Free cognitive behavioral therapy for insomnia, available for iOS and Android.  Sanvello: A program for reducing stress and treating anxiety and depression that includes a  or groups.  [facility]: A program for clinical anxiety or depression that uses an lexus and therapist, and biofeedback monitor is optional. Referral from a health care provider is necessary.  Happify: Some free content, including stress reduction and cognitive techniques to address anxiety.  MindShift  CBT: Free content, including cognitive behavioral therapy strategies to address general worry, social anxiety, and panic.  PTSD : Created by [employer]s National Center for PTSD and the Department of Defenses National [employer] for [employer]. This bobbi provides you with education about post-traumatic Stress Disorder (PTSD), information about professional care, a self-assessment for PTSD, opportunities to find support, and tools that can help you manage the stresses of daily life with PTSD.  PTSD : In conjunction with PTSD , the PTSD  bobbi is for family members of those living with PTSD. The bobbi provides extensive information about PTSD, how to take care of yourself, how to take care of your relationship with your loved one or with children, and how to help your loved one get the treatment they deserve.

## 2022-10-17 ENCOUNTER — OFFICE VISIT (OUTPATIENT)
Dept: PSYCHIATRY | Facility: CLINIC | Age: 22
End: 2022-10-17
Payer: MEDICAID

## 2022-10-17 DIAGNOSIS — F31.62 MIXED BIPOLAR AFFECTIVE DISORDER, MODERATE: Primary | ICD-10-CM

## 2022-10-17 DIAGNOSIS — F41.1 GENERALIZED ANXIETY DISORDER: ICD-10-CM

## 2022-10-17 PROCEDURE — 99211 OFF/OP EST MAY X REQ PHY/QHP: CPT | Mod: PBBFAC | Performed by: PSYCHOLOGIST

## 2022-10-17 PROCEDURE — 99999 PR PBB SHADOW E&M-EST. PATIENT-LVL I: CPT | Mod: PBBFAC,HA,, | Performed by: PSYCHOLOGIST

## 2022-10-17 PROCEDURE — 99215 PR OFFICE/OUTPT VISIT, EST, LEVL V, 40-54 MIN: ICD-10-PCS | Mod: 95,HP,HA, | Performed by: PSYCHOLOGIST

## 2022-10-17 PROCEDURE — 99215 OFFICE O/P EST HI 40 MIN: CPT | Mod: 95,HP,HA, | Performed by: PSYCHOLOGIST

## 2022-10-17 PROCEDURE — 1159F MED LIST DOCD IN RCRD: CPT | Mod: 95,HP,HA,CPTII | Performed by: PSYCHOLOGIST

## 2022-10-17 PROCEDURE — 99999 PR PBB SHADOW E&M-EST. PATIENT-LVL I: ICD-10-PCS | Mod: PBBFAC,HA,, | Performed by: PSYCHOLOGIST

## 2022-10-17 PROCEDURE — 1159F PR MEDICATION LIST DOCUMENTED IN MEDICAL RECORD: ICD-10-PCS | Mod: 95,HP,HA,CPTII | Performed by: PSYCHOLOGIST

## 2022-10-17 RX ORDER — FLUOXETINE HYDROCHLORIDE 40 MG/1
40 CAPSULE ORAL DAILY
Qty: 30 CAPSULE | Refills: 2 | Status: SHIPPED | OUTPATIENT
Start: 2022-10-17 | End: 2023-01-18 | Stop reason: SDUPTHER

## 2022-10-17 RX ORDER — OLANZAPINE 7.5 MG/1
7.5 TABLET ORAL NIGHTLY
Qty: 30 TABLET | Refills: 2 | Status: SHIPPED | OUTPATIENT
Start: 2022-10-17 | End: 2023-01-18 | Stop reason: SDUPTHER

## 2022-10-17 RX ORDER — HYDROXYZINE PAMOATE 25 MG/1
25 CAPSULE ORAL 3 TIMES DAILY PRN
COMMUNITY
Start: 2022-09-21 | End: 2023-01-18

## 2022-10-17 NOTE — PROGRESS NOTES
"Outpatient Psychiatry Follow-Up Visit     10/17/2022    Timeframe: Corona Virus Outbreak     The patient location is: Patient's home/ Patient reported that his/her location at the time of this visit was in the [facility]     Visit type: Virtual visit with synchronous audio and video     Each patient to whom he or she provides medical services by telehealth is: (1) informed of the relationship between the medical psychologist and patient and the respective role of any other health care provider with respect to management of the patient; and (2) notified that he or she may decline to receive medical services by telehealth and may withdraw from such care at any time.    I also informed patient of the following:   Milli Medina, PhD, MPAP:  [place] medical license number: [id number]    My contact info:  [facility] / 2nd Floor  [address] [place] [address]   Ph: [phone]    If technology issues, call office phone: Ph: [phone]  If crisis: Dial 911 or go to nearest Emergency Room (ER)  If questions related to privacy practices: contact [employer] Information Department: [phone]  Clinical Status of Patient:  Outpatient (Ambulatory)    Chief Complaint:  Mahamed Fernandez is a 22 y.o. female who presents today for follow-up of mood and anxiety.      Impressions/Plan from last visit: Mahamed attended her visit. She said that things have "been all over the place." She believes that she is doing well overall--she is having trouble with sleep. She said that she wakes up frequently during the night. She reported that she thinks about "deep thoughts"--heavy and depressing. She also reportedly zones out like this, too, at work--thinking "deep thoughts." As we talked, she said that this happens more during the day--she stares into outer space. She said that she may have a song playing in her head and then will think "what ifs" about "what if I get fired" or " ""what if I get into another wreck." We discussed anxiety in general and attacking "panic" through calming her body physiologically and cognitively through what she says to herself during those times. We also talked about sleep hygiene--after 30 minutes, doing something that keeps her brain busy but is not overly distracting (like [place] or word find). Her mom had reportedly asked about Vistaril, but there is a potential drug-drug interaction with Lexapro. We agreed on a trial of doxepin to help with sleep.    Medicines--continue Lexapro 10 mg; Abilify 400 mg (injection) monthly; stop Xanax (use only as needed but no additional refills); add doxepin 10 mg hs.     since March 2022.    Interval History and Content of Current Session: Tiffanie (mom) and Mahamed attended her virtual visit. 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 She is anxious--about whether a crowd will come in. She also reported feeling anxious when in a busy, crowded place. She has been taking Prozac--PCP recently added Vistaril/hydroxyzine. Mom is concerned about her and requested that we go back to Zyprexa. That worked well for her in the past. She had done the best on Zyprexa and Prozac combination. We agreed to go back to that--she has not had her Abilify dose in over a month--will start Zyprexa without cross-over. We will get labs next visit.    Plan--increase Prozac 40 mg; change to Zyprexa 7.5 mg (may take 1/2 tab if needed)      GAD7 10/17/2022 9/26/2022 7/13/2022   1. Feeling nervous, anxious, or on edge? 1 0 0   2. Not being able to stop or control worrying? 1 0 0   3. Worrying too much about different things? 1 0 0   4. Trouble relaxing? 1 0 0   5. Being so restless that it is hard to sit still? 0 0 0   6. Becoming easily annoyed or irritable? 1 0 0   7. Feeling afraid as if something awful might happen? 1 0 0   8. If you checked off any problems, how difficult have these problems made it for you to do your work, take care of " things at home, or get along with other people? - - -   VIELKA-7 Score 6 0 0      0-4 = Minimal anxiety  5-9 = Mild anxiety  10-14 = Moderate anxiety  15-21 = Severe anxiety       Review of Systems   PSYCHIATRIC: Pertinant items are noted in the narrative.    Past Medical, Family and Social History: The patient's past medical, family and social history have been reviewed and updated as appropriate within the electronic medical record - see encounter notes.      Current Outpatient Medications:     FLUoxetine 40 MG capsule, Take 1 capsule (40 mg total) by mouth once daily., Disp: 30 capsule, Rfl: 2    hydrOXYzine pamoate (VISTARIL) 25 MG Cap, Take 25 mg by mouth 3 (three) times daily as needed., Disp: , Rfl:     OLANZapine (ZYPREXA) 7.5 MG tablet, Take 1 tablet (7.5 mg total) by mouth every evening., Disp: 30 tablet, Rfl: 2    Current Facility-Administered Medications:     levonorgestreL 14 mcg/24 hrs (3 yrs) 13.5 mg IUD 14 mcg, 14 mcg, Intrauterine, , Arina Ruiz MD, 14 mcg at 03/29/21 1300    Compliance: yes    Side effects: see above    Risk Parameters:  Patient reports no suicidal ideation  Patient reports no homicidal ideation  Patient reports no self-injurious behavior  Patient reports no violent behavior    Exam (detailed: at least 9 elements; comprehensive: all 15 elements)   Constitutional  Vitals:  Most recent vital signs were reviewed.   Last 3 sets of Vitals    Vitals - 1 value per visit 3/29/2021 3/15/2022 6/15/2022   SYSTOLIC 108 110 105   DIASTOLIC 74 71 71   Pulse - 80 80   Temp - - -   Resp - - -   SPO2 - - -   Weight (lb) 130.95 121.91 121.03   Weight (kg) 59.4 55.3 54.9   Height 62.5 - 62.5   BMI (Calculated) 23.6 - 21.8   VISIT REPORT - - -   Pain Score  - - -          General:  age appropriate, casually dressed, neatly groomed, has cut/styled hair; wearing makeup     Musculoskeletal  Muscle Strength/Tone:  no tremor, no tic   Gait & Station:  video visit     Psychiatric  Speech:  no latency;  "no press   Behavior: wnl   Mood & Affect:  "anxious"  congruent and appropriate   Thought Process:  normal and logical   Associations:  intact   Thought Content:  normal, no suicidality, no homicidality, delusions, or paranoia   Insight:  has awareness of illness   Judgement: behavior is adequate to circumstances   Orientation:  grossly intact   Memory: intact for content of interview   Language: grossly intact   Attention Span & Concentration:  Grossly intact   Fund of Knowledge:  intact and appropriate to age and level of education     Assessment and Diagnosis   Status/Progress: Based on the examination today, the patient's problem(s) is/are adequately but not ideally controlled and treatment resistant.  New problems have not been presented today.   Co-morbidities are complicating management of the primary condition.  There are no active rule-out diagnoses for this patient at this time.     General Impression:     Encounter Diagnoses   Name Primary?    Mixed bipolar affective disorder, moderate Yes    Generalized anxiety disorder          Intervention/Counseling/Treatment Plan   Medication Management: Discussed risks, benefits, and alternatives to treatment plan documented above with patient. I answered all patient questions related to this plan, and patient expressed understanding and agreement.   increase Prozac 40 mg; change to Zyprexa 7.5 mg (may take 1/2 tab if needed)  continue therapy        Return to Clinic:  2-3 months  in clinic Wed morning at 8    Medication List with Changes/Refills   New Medications    OLANZAPINE (ZYPREXA) 7.5 MG TABLET    Take 1 tablet (7.5 mg total) by mouth every evening.   Current Medications    HYDROXYZINE PAMOATE (VISTARIL) 25 MG CAP    Take 25 mg by mouth 3 (three) times daily as needed.   Changed and/or Refilled Medications    Modified Medication Previous Medication    FLUOXETINE 40 MG CAPSULE FLUoxetine 20 MG capsule       Take 1 capsule (40 mg total) by mouth once daily.    " Take 1 capsule (20 mg total) by mouth once daily.   Discontinued Medications    ARIPIPRAZOLE (ABILIFY MAINTENA) 400 MG SERS SYRINGE    Inject 400 mg into the muscle every 28 days.    DOXEPIN (SINEQUAN) 10 MG CAPSULE    Take 1 capsule (10 mg total) by mouth every evening.        Time spent with pt including note preparation: 41 minutes     Milli Medina, PhD, MP  Advanced Practice Medical Psychologist  [facility]  [address]  [place] [address]  [phone]   [phone] fax

## 2022-10-17 NOTE — LETTER
October 17, 2022        Adele Tsang MD  [address]  [place]  [facility] [address]             [facility]  [address]  [place] [address]  Phone: [phone]  Fax: [phone]   Patient: Mahamed Fernandez   MR Number: [id number]   YOB: 2000   Date of Visit: 10/17/2022     Dear Dr. Tsang,     I saw Mahamed and her mom today for follow-up. Please see attached, and let me know if you have any questions or need more information. Please send a copy of her most recent labs to me at the fax to the left. I appreciate following her along with you.    Sincerely,    Milli Medina, PhD, MPAP  Advanced Practice Medical Psychologist              CC  Shruti Cox, [facility]    Enclosure

## 2022-10-17 NOTE — PATIENT INSTRUCTIONS
"[facility] OF [facility]   PATIENT INFORMATION    We appreciate the opportunity to participate in your medical care and hope the following protocols will make it easier for you to receive quality treatment in our department.    PUNCTUALITY: Your appointment is scheduled for a fixed amount of time, reserved especially for you.  To get the benefit of your appointment, please arrive at least 15 minutes early to allow time for traffic, parking and registration.  Should you arrive more than 15 minutes late to your appointment, you will be rescheduled in order to assure your clinician has adequate time to assess you and provide helpful care.      APPOINTMENTS: Appointments are made by the nursing/front office staff or through the patient portal. Providers do not have access  to schedule appointments. Walk in appointments are not available. FOR EMERGENCIES, PLEASE GO THE CLOSEST EMERGENCY ROOM.    CANCELLATION/MISSED APPOINTMENTS:   In order to receive quality care, all appointments must be kept.  If you are unable to keep an appointment, please reschedule at least 3 days prior if possible. Late cancellations (within 24 hours of the appointment) and repeated no-show appointments may result in dismissal from the clinic. After two no show/late cancellation visits, you will receive a notice letter, alerting you to keep visits to prevent department dismissal. If another visit is missed after receipt of the notice, you will be discharged from the clinic. This policy is in effect to allow for other individuals on a long waiting list to be seen as soon as possible. Unlike other branches of medicine where several individuals can be scheduled in a 30 minute time slot, only one individual can be scheduled in any time slot in Psychiatry.     MESSAGES: For simple questions/concerns, you may contact your individual providers electronically through the "[facility]" portal or by calling [phone] " with messages relayed via office staff. If relevant, include pharmacy name and phone number, date of last visit and next scheduled visit, phone number where you can be reached throughout the day, and whether leaving a voicemail or message on an answering machine is acceptable. Messages will be returned by the Medical Assistant or Office Staff after your provider has reviewed the message.  Please allow 24 hours for a returned message before leaving another message. Messages will be checked each workday (Monday through Friday) during office hours (8:00 a.m. and 5:00 p.m.) and returned at most within one business day.  You may leave a non-urgent message after hours. Note that psychotherapy and medication management are not appropriate by telephone or the patient portal.    PRESCRIPTION REFILLS:  Please communicate with your prescriber about any refills you need during your appointment. You may also request refills through the [facility] portal (preferred) or by calling the clinic. Prescriptions will be filled during office hours.     Please do not wait until you are completely out of medication to request refills. Same day refills are not always possible. Patients may experience symptoms of withdrawal if they run out of medications. The patient assumes all responsibility when there is an issue with non-compliance with follow-up appointments and medications.  Some medications are controlled and regulated by the FDA and ROX. Some of these medications can not be refilled before 30 days and require a face to face appointment.     PAPERWORK REQUESTS: If you have any forms or letters that need to be completed by your doctor, please present these at the beginning of the appointment to ensure that information needed to complete them is obtained during the office visit. Paperwork will be returned within 7-10 business days. Staff will call you to  the paperwork when completed.    SPECIAL EVALUATIONS: Please note that our  "department is treatment-focused. As such, we focus on treatment-oriented evaluations and do not perform specialty or "forensic" evaluations. Examples are listed below.    Disability: We do not do disability evaluations.  Please contact Social Security Administration for evaluations and determinations. You will then sign releases allowing for records from your treatment providers to be forwarded to Social Security Administration to use in their evaluation.  Gun Permit: We do not offer Sound Judgment Evaluations or assessments leading to gun ownership, nor do we fill out or file paperwork relevant to owning, concealing or purchasing a firearm.  Emotional Support     Animals (IAN): We do not provide documentation, including letters, to aid in the acclamation that an Emotional Support Animal is required. Note that ESAs are not trained to perform tasks or recognize particular signs or symptoms. Rather, they are distinguished by the close, emotional, and supportive bond between the animal and the owner.       SAMPLES: We do not provide samples of any medications. If you have financial difficulties and are on a limited income, you may qualify for Patient Assistance Programs from various pharmaceutical companies. This will require that you complete paperwork with your financial information, but this does not guarantee that the company will approve the application. Alternative medication options can be discussed.    REFERRALS/COORDINATION: You will be referred to other providers if we feel unable to adequately diagnose or treat your particular condition, or if collaboration with another provider would allow for better management of your condition.       Call In if problems  Call Report Side Effects   Encouraged to follow up with primary care / Gen Med MD for continued monitoring of general health and wellness  Call 911 Or go to ER if Acute Concerns (especially if any thoughts of harm to self or other)          Milli Medina, " PhD, MP  Advanced Practice Medical Psychologist  [facility]  [address]  [place] [address] [phone]   [phone] fax

## 2023-01-18 ENCOUNTER — OFFICE VISIT (OUTPATIENT)
Dept: PSYCHIATRY | Facility: CLINIC | Age: 23
End: 2023-01-18
Payer: MEDICAID

## 2023-01-18 VITALS
BODY MASS INDEX: 24.17 KG/M2 | DIASTOLIC BLOOD PRESSURE: 72 MMHG | WEIGHT: 134.25 LBS | HEART RATE: 82 BPM | SYSTOLIC BLOOD PRESSURE: 115 MMHG

## 2023-01-18 DIAGNOSIS — Z51.81 THERAPEUTIC DRUG MONITORING: ICD-10-CM

## 2023-01-18 DIAGNOSIS — F41.1 GENERALIZED ANXIETY DISORDER: ICD-10-CM

## 2023-01-18 DIAGNOSIS — F31.62 MIXED BIPOLAR AFFECTIVE DISORDER, MODERATE: Primary | ICD-10-CM

## 2023-01-18 DIAGNOSIS — R63.5 WEIGHT GAIN: ICD-10-CM

## 2023-01-18 PROCEDURE — 99999 PR PBB SHADOW E&M-EST. PATIENT-LVL II: ICD-10-PCS | Mod: PBBFAC,HA,, | Performed by: PSYCHOLOGIST

## 2023-01-18 PROCEDURE — 3008F BODY MASS INDEX DOCD: CPT | Mod: HP,HA,CPTII, | Performed by: PSYCHOLOGIST

## 2023-01-18 PROCEDURE — 3008F PR BODY MASS INDEX (BMI) DOCUMENTED: ICD-10-PCS | Mod: HP,HA,CPTII, | Performed by: PSYCHOLOGIST

## 2023-01-18 PROCEDURE — 99212 OFFICE O/P EST SF 10 MIN: CPT | Mod: PBBFAC | Performed by: PSYCHOLOGIST

## 2023-01-18 PROCEDURE — 3078F DIAST BP <80 MM HG: CPT | Mod: HP,HA,CPTII, | Performed by: PSYCHOLOGIST

## 2023-01-18 PROCEDURE — 1159F PR MEDICATION LIST DOCUMENTED IN MEDICAL RECORD: ICD-10-PCS | Mod: HP,HA,CPTII, | Performed by: PSYCHOLOGIST

## 2023-01-18 PROCEDURE — 1159F MED LIST DOCD IN RCRD: CPT | Mod: HP,HA,CPTII, | Performed by: PSYCHOLOGIST

## 2023-01-18 PROCEDURE — 99215 OFFICE O/P EST HI 40 MIN: CPT | Mod: HP,HA,S$PBB, | Performed by: PSYCHOLOGIST

## 2023-01-18 PROCEDURE — 3074F SYST BP LT 130 MM HG: CPT | Mod: HP,HA,CPTII, | Performed by: PSYCHOLOGIST

## 2023-01-18 PROCEDURE — 99215 PR OFFICE/OUTPT VISIT, EST, LEVL V, 40-54 MIN: ICD-10-PCS | Mod: HP,HA,S$PBB, | Performed by: PSYCHOLOGIST

## 2023-01-18 PROCEDURE — 3078F PR MOST RECENT DIASTOLIC BLOOD PRESSURE < 80 MM HG: ICD-10-PCS | Mod: HP,HA,CPTII, | Performed by: PSYCHOLOGIST

## 2023-01-18 PROCEDURE — 99999 PR PBB SHADOW E&M-EST. PATIENT-LVL II: CPT | Mod: PBBFAC,HA,, | Performed by: PSYCHOLOGIST

## 2023-01-18 PROCEDURE — 3074F PR MOST RECENT SYSTOLIC BLOOD PRESSURE < 130 MM HG: ICD-10-PCS | Mod: HP,HA,CPTII, | Performed by: PSYCHOLOGIST

## 2023-01-18 RX ORDER — FLUOXETINE HYDROCHLORIDE 40 MG/1
40 CAPSULE ORAL DAILY
Qty: 30 CAPSULE | Refills: 3 | Status: SHIPPED | OUTPATIENT
Start: 2023-01-18 | End: 2023-05-05 | Stop reason: SDUPTHER

## 2023-01-18 RX ORDER — OLANZAPINE 7.5 MG/1
7.5 TABLET ORAL NIGHTLY
Qty: 30 TABLET | Refills: 3 | Status: SHIPPED | OUTPATIENT
Start: 2023-01-18 | End: 2023-05-05 | Stop reason: SDUPTHER

## 2023-01-18 NOTE — PROGRESS NOTES
Outpatient Psychiatry Follow-Up Visit     1/18/2023    Timeframe: Corona Virus Outbreak     The patient location is: Patient's home/ Patient reported that his/her location at the time of this visit was in the [facility]     Visit type: Virtual visit with synchronous audio and video     Each patient to whom he or she provides medical services by telehealth is: (1) informed of the relationship between the medical psychologist and patient and the respective role of any other health care provider with respect to management of the patient; and (2) notified that he or she may decline to receive medical services by telehealth and may withdraw from such care at any time.    I also informed patient of the following:   Milli Medina, PhD, MPAP:  [place]: [id number]    My contact info:  [facility] 2nd Floor  [address] [place] [address]   Ph: [phone]    If technology issues, call office phone: Ph: [phone]  If crisis: Dial 911 or go to nearest Emergency Room (ER)  If questions related to privacy practices: contact [employer] Information Department: [phone]  Clinical Status of Patient:  Outpatient (Ambulatory)    Chief Complaint:  Mahamed Fernandez is a 22 y.o. female who presents today for follow-up of mood and anxiety.      Impressions/Plan from last visit: Tiffanie (mom) and Mahamed attended her virtual visit. 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 She is anxious--about whether a crowd will come in. She also reported feeling anxious when in a busy, crowded place. She has been taking Prozac--PCP recently added Vistaril/hydroxyzine. Mom is concerned about her and requested that we go back to Zyprexa. That worked well for her in the past. She had done the best on Zyprexa and Prozac combination. We agreed to go back to that--she has not had her Abilify dose in over a month--will start Zyprexa without cross-over. We will  get labs next visit.    Plan--increase Prozac 40 mg; change to Zyprexa 7.5 mg (may take 1/2 tab if needed)    Interval History and Content of Current Session: Tiffanie (mom) and Mahamed attended her visit. She is doing well--forgets her medicine sometimes but has done fairly well. She is working in a new salon ([employer]). She is busy and enjoys it. She has dyed her hair hot pink--she said that it was purple a couple of weeks ago. She is in full makeup, too. She does believe that her medicine is working--mom also noted that this is the best she has been in a long time. She reported that she is eating at night on the medicine--we discussed decreasing but she is hesitant to do that. For now, we agreed to continue her medicines as prescribed. She needs labs--will put in order. There is some weight gain--will need to monitor.    Plan--continue Prozac 40 mg, Zyprexa 7.5 mg; labs (CBC, CMP, lipid panel, TSH)     for the last two years.            GAD7 10/17/2022 9/26/2022 7/13/2022   1. Feeling nervous, anxious, or on edge? 1 0 0   2. Not being able to stop or control worrying? 1 0 0   3. Worrying too much about different things? 1 0 0   4. Trouble relaxing? 1 0 0   5. Being so restless that it is hard to sit still? 0 0 0   6. Becoming easily annoyed or irritable? 1 0 0   7. Feeling afraid as if something awful might happen? 1 0 0   8. If you checked off any problems, how difficult have these problems made it for you to do your work, take care of things at home, or get along with other people? - - -   VIELKA-7 Score 6 0 0      0-4 = Minimal anxiety  5-9 = Mild anxiety  10-14 = Moderate anxiety  15-21 = Severe anxiety       Review of Systems   PSYCHIATRIC: Pertinant items are noted in the narrative.    Past Medical, Family and Social History: The patient's past medical, family and social history have been reviewed and updated as appropriate within the electronic medical record - see encounter notes.      Current  Outpatient Medications:     FLUoxetine 40 MG capsule, Take 1 capsule (40 mg total) by mouth once daily., Disp: 30 capsule, Rfl: 3    OLANZapine (ZYPREXA) 7.5 MG tablet, Take 1 tablet (7.5 mg total) by mouth every evening., Disp: 30 tablet, Rfl: 3    Current Facility-Administered Medications:     levonorgestreL 14 mcg/24 hrs (3 yrs) 13.5 mg IUD 14 mcg, 14 mcg, Intrauterine, , Arina Ruiz MD, 14 mcg at 03/29/21 1300    Compliance: yes    Side effects: see above    Risk Parameters:  Patient reports no suicidal ideation  Patient reports no homicidal ideation  Patient reports no self-injurious behavior  Patient reports no violent behavior    Exam (detailed: at least 9 elements; comprehensive: all 15 elements)   Constitutional  Vitals:  Most recent vital signs were reviewed.   Last 3 sets of Vitals    Vitals - 1 value per visit 3/15/2022 6/15/2022 1/18/2023   SYSTOLIC 110 105 115   DIASTOLIC 71 71 72   Pulse 80 80 82   Temp - - -   Resp - - -   SPO2 - - -   Weight (lb) 121.91 121.03 134.26   Weight (kg) 55.3 54.9 60.9   Height - 62.5 -   BMI (Calculated) - 21.8 -   VISIT REPORT - - -   Pain Score  - - -          General:  age appropriate, casually dressed, neatly groomed, has cut/styled hair--hot pink; wearing makeup; got braces     Musculoskeletal  Muscle Strength/Tone:  no tremor, no tic   Gait & Station:  non-ataxic     Psychiatric  Speech:  no latency; no press   Behavior: wnl   Mood & Affect:  euthymic  congruent and appropriate   Thought Process:  normal and logical   Associations:  intact   Thought Content:  normal, no suicidality, no homicidality, delusions, or paranoia   Insight:  has awareness of illness   Judgement: behavior is adequate to circumstances   Orientation:  grossly intact   Memory: intact for content of interview   Language: grossly intact   Attention Span & Concentration:  Grossly intact   Fund of Knowledge:  intact and appropriate to age and level of education     Assessment and Diagnosis    Status/Progress: Based on the examination today, the patient's problem(s) is/are fairly well controlled.  New problems have not been presented today.   Co-morbidities and side effects (weight gain)  are complicating management of the primary condition.  There are no active rule-out diagnoses for this patient at this time.     General Impression:     Encounter Diagnoses   Name Primary?    Mixed bipolar affective disorder, moderate Yes    Generalized anxiety disorder     Weight gain     Therapeutic drug monitoring            Intervention/Counseling/Treatment Plan   Medication Management: Discussed risks, benefits, and alternatives to treatment plan documented above with patient. I answered all patient questions related to this plan, and patient expressed understanding and agreement.   continue Prozac 40 mg, Zyprexa 7.5 mg  continue therapy    Put medicine in week pack by toothbrush to take in mornings when brushing teeth  Labs--prior to next visit--CBC, CMP, lipid panel, TSH      Return to Clinic: 3 months     Medication List with Changes/Refills   Changed and/or Refilled Medications    Modified Medication Previous Medication    FLUOXETINE 40 MG CAPSULE FLUoxetine 40 MG capsule       Take 1 capsule (40 mg total) by mouth once daily.    Take 1 capsule (40 mg total) by mouth once daily.    OLANZAPINE (ZYPREXA) 7.5 MG TABLET OLANZapine (ZYPREXA) 7.5 MG tablet       Take 1 tablet (7.5 mg total) by mouth every evening.    Take 1 tablet (7.5 mg total) by mouth every evening.   Discontinued Medications    HYDROXYZINE PAMOATE (VISTARIL) 25 MG CAP    Take 25 mg by mouth 3 (three) times daily as needed.        Time spent with pt including note preparation: ** minutes     Milli Medina, PhD, MP  Advanced Practice Medical Psychologist  [facility]  [place] [address]  [phone]   [phone] fax

## 2023-01-18 NOTE — PATIENT INSTRUCTIONS
"[facility] DEPARTMENT OF PSYCHIATRY   PATIENT INFORMATION    We appreciate the opportunity to participate in your medical care and hope the following protocols will make it easier for you to receive quality treatment in our department.    PUNCTUALITY: Your appointment is scheduled for a fixed amount of time, reserved especially for you.  To get the benefit of your appointment, please arrive at least 15 minutes early to allow time for traffic, parking and registration.  Should you arrive more than 15 minutes late to your appointment, you will be rescheduled in order to assure your clinician has adequate time to assess you and provide helpful care.      APPOINTMENTS: Appointments are made by the nursing/front office staff or through the patient portal. Providers do not have access  to schedule appointments. Walk in appointments are not available. FOR EMERGENCIES, PLEASE GO THE CLOSEST EMERGENCY ROOM.    CANCELLATION/MISSED APPOINTMENTS:   In order to receive quality care, all appointments must be kept.  If you are unable to keep an appointment, please reschedule at least 3 days prior if possible. Late cancellations (within 24 hours of the appointment) and repeated no-show appointments may result in dismissal from the clinic. After two no show/late cancellation visits, you will receive a notice letter, alerting you to keep visits to prevent department dismissal. If another visit is missed after receipt of the notice, you will be discharged from the clinic. This policy is in effect to allow for other individuals on a long waiting list to be seen as soon as possible. Unlike other branches of medicine where several individuals can be scheduled in a 30 minute time slot, only one individual can be scheduled in any time slot in Psychiatry.     MESSAGES: For simple questions/concerns, you may contact your individual providers electronically through the "[facility]" portal or by calling [phone] " with messages relayed via office staff. If relevant, include pharmacy name and phone number, date of last visit and next scheduled visit, phone number where you can be reached throughout the day, and whether leaving a voicemail or message on an answering machine is acceptable. Messages will be returned by the Medical Assistant or Office Staff after your provider has reviewed the message.  Please allow 24 hours for a returned message before leaving another message. Messages will be checked each workday (Monday through Friday) during office hours (8:00 a.m. and 5:00 p.m.) and returned at most within one business day.  You may leave a non-urgent message after hours. Note that psychotherapy and medication management are not appropriate by telephone or the patient portal.    PRESCRIPTION REFILLS:  Please communicate with your prescriber about any refills you need during your appointment. You may also request refills through the [facility] portal (preferred) or by calling the clinic. Prescriptions will be filled during office hours.     Please do not wait until you are completely out of medication to request refills. Same day refills are not always possible. Patients may experience symptoms of withdrawal if they run out of medications. The patient assumes all responsibility when there is an issue with non-compliance with follow-up appointments and medications.  Some medications are controlled and regulated by the FDA and ROX. Some of these medications can not be refilled before 30 days and require a face to face appointment.     PAPERWORK REQUESTS: If you have any forms or letters that need to be completed by your doctor, please present these at the beginning of the appointment to ensure that information needed to complete them is obtained during the office visit. Paperwork will be returned within 7-10 business days. Staff will call you to  the paperwork when completed.    SPECIAL EVALUATIONS: Please note that our  "department is treatment-focused. As such, we focus on treatment-oriented evaluations and do not perform specialty or "forensic" evaluations. Examples are listed below.    Disability: We do not do disability evaluations.  Please contact Social Security Administration for evaluations and determinations. You will then sign releases allowing for records from your treatment providers to be forwarded to Social Security Administration to use in their evaluation.  Gun Permit: We do not offer Sound Judgment Evaluations or assessments leading to gun ownership, nor do we fill out or file paperwork relevant to owning, concealing or purchasing a firearm.  Emotional Support     Animals (IAN): We do not provide documentation, including letters, to aid in the acclamation that an Emotional Support Animal is required. Note that ESAs are not trained to perform tasks or recognize particular signs or symptoms. Rather, they are distinguished by the close, emotional, and supportive bond between the animal and the owner.       SAMPLES: We do not provide samples of any medications. If you have financial difficulties and are on a limited income, you may qualify for Patient Assistance Programs from various pharmaceutical companies. This will require that you complete paperwork with your financial information, but this does not guarantee that the company will approve the application. Alternative medication options can be discussed.    REFERRALS/COORDINATION: You will be referred to other providers if we feel unable to adequately diagnose or treat your particular condition, or if collaboration with another provider would allow for better management of your condition.       Call In if problems  Call Report Side Effects   Encouraged to follow up with primary care / Gen Med MD for continued monitoring of general health and wellness  Call 911 Or go to ER if Acute Concerns (especially if any thoughts of harm to self or other)  Put medicine in week " pack by toothbrush to take in mornings when brushing teeth          Milli Medina, PhD, MP  Advanced Practice Medical Psychologist  [facility]  [address]  [place] [id number] [phone] ph  [phone] fax

## 2023-01-18 NOTE — LETTER
January 18, 2023    Mahamed Fernandez  [address]   [place] [address]             [facility]  Psychiatry  [address]  [place] [address]  Phone: [phone]  Fax: [phone]   January 18, 2023     Patient: Mahamed Fernandez   YOB: 2000   Date of Visit: 1/18/2023       To Whom it May Concern:    Mahamed Fernandez was seen in my clinic on 1/18/2023. She may return to work on 1/18/23 .    Please excuse her from any classes or work missed.    If you have any questions or concerns, please don't hesitate to call.    Sincerely,     Milli Medina, PhD, MPAP  Advanced Practice Medical Psychologist

## 2023-01-27 NOTE — TELEPHONE ENCOUNTER
[facility] Breast Clinic       Patient:   Princess Núñez           MRN: [id number]                         Age:   56 year old     Sex:  female     :  1966  Associated Diagnoses:   Breast Cancer  Author:   EDEL VALDIVIA      Patient Education   Disease Education   Here in Breast MDOC to see this 56 year old female with newly diagnosed High grade IDC LEFT breast, grade 3, and metastatic carcinoma, Left axillary LN, awaiting further pathology results per core biopsy at [facility] on 23.  Patient is accompanied by her  and brother-in-law today for consultation, receptive to visit and information.  Plan is for testing to include Breast MRI, CT C/A/P, Bone scan and Echo, as well as genetic counseling and testing.  NAC is planned, awaiting final pathology and test results to determine final plan.    All questions addressed, support offered. .       Method of instruction: Patient handout, verbal instruction, written instruction via LIANNA Kessler.       Learner's ability and readiness: Educational materials provided to patient, including Breast Cancer Treatment Handbook and MDOC folder. Questions addressed, support offered.      Areas of instruction: Disease process, role of Nurse Navigator, nurse navigator contact information provided.       Outcome of instruction: Verbalization of understanding of disease process instructions.       Follow Up: patient will follow up with nurse navigator as needed, nurse navigator will follow up with patient.       Counseling Summary   This was a 45 minutes. visit with greater than 50% of that time spent counseling the patient.       Counseling included: Treatment options, new diagnosis.       The patient/family was: Interactive, Attentive, Asked questions and Verbalized understanding.           EDEL VALDIVIA  2023 5:04 PM     This is Dr. Ruiz's pt can you let her and her staff know so they can schedule her to see Dr. Ruiz

## 2023-04-27 ENCOUNTER — PATIENT MESSAGE (OUTPATIENT)
Dept: PSYCHIATRY | Facility: CLINIC | Age: 23
End: 2023-04-27

## 2023-04-27 PROBLEM — F31.60 BIPOLAR I DISORDER, MOST RECENT EPISODE (OR CURRENT) MIXED: Status: ACTIVE | Noted: 2020-10-21

## 2023-05-05 DIAGNOSIS — F31.62 MIXED BIPOLAR AFFECTIVE DISORDER, MODERATE: ICD-10-CM

## 2023-05-05 DIAGNOSIS — F41.1 GENERALIZED ANXIETY DISORDER: ICD-10-CM

## 2023-05-08 ENCOUNTER — PATIENT MESSAGE (OUTPATIENT)
Dept: PSYCHIATRY | Facility: CLINIC | Age: 23
End: 2023-05-08
Payer: MEDICAID

## 2023-05-08 RX ORDER — FLUOXETINE HYDROCHLORIDE 40 MG/1
40 CAPSULE ORAL DAILY
Qty: 30 CAPSULE | Refills: 2 | Status: SHIPPED | OUTPATIENT
Start: 2023-05-08 | End: 2023-11-03 | Stop reason: SDUPTHER

## 2023-05-08 RX ORDER — OLANZAPINE 7.5 MG/1
7.5 TABLET ORAL NIGHTLY
Qty: 30 TABLET | Refills: 2 | Status: SHIPPED | OUTPATIENT
Start: 2023-05-08 | End: 2023-11-03 | Stop reason: SDUPTHER

## 2023-08-08 ENCOUNTER — OFFICE VISIT (OUTPATIENT)
Dept: OBSTETRICS AND GYNECOLOGY | Facility: CLINIC | Age: 23
End: 2023-08-08
Payer: MEDICAID

## 2023-08-08 VITALS
SYSTOLIC BLOOD PRESSURE: 120 MMHG | HEIGHT: 63 IN | DIASTOLIC BLOOD PRESSURE: 68 MMHG | WEIGHT: 155.44 LBS | BODY MASS INDEX: 27.54 KG/M2

## 2023-08-08 DIAGNOSIS — Z11.3 SCREENING FOR STD (SEXUALLY TRANSMITTED DISEASE): ICD-10-CM

## 2023-08-08 DIAGNOSIS — Z30.09 ENCOUNTER FOR OTHER GENERAL COUNSELING AND ADVICE ON CONTRACEPTION: ICD-10-CM

## 2023-08-08 DIAGNOSIS — Z01.419 ENCOUNTER FOR GYNECOLOGICAL EXAMINATION WITHOUT ABNORMAL FINDING: Primary | ICD-10-CM

## 2023-08-08 DIAGNOSIS — Z30.431 IUD CHECK UP: ICD-10-CM

## 2023-08-08 LAB
C TRACH DNA SPEC QL NAA+PROBE: NOT DETECTED
N GONORRHOEA DNA SPEC QL NAA+PROBE: NOT DETECTED

## 2023-08-08 PROCEDURE — 99999 PR PBB SHADOW E&M-EST. PATIENT-LVL III: ICD-10-PCS | Mod: PBBFAC,,, | Performed by: NURSE PRACTITIONER

## 2023-08-08 PROCEDURE — 3074F SYST BP LT 130 MM HG: CPT | Mod: CPTII,,, | Performed by: NURSE PRACTITIONER

## 2023-08-08 PROCEDURE — 88175 CYTOPATH C/V AUTO FLUID REDO: CPT | Performed by: NURSE PRACTITIONER

## 2023-08-08 PROCEDURE — 99999 PR PBB SHADOW E&M-EST. PATIENT-LVL III: CPT | Mod: PBBFAC,,, | Performed by: NURSE PRACTITIONER

## 2023-08-08 PROCEDURE — 87591 N.GONORRHOEAE DNA AMP PROB: CPT | Performed by: NURSE PRACTITIONER

## 2023-08-08 PROCEDURE — 3078F PR MOST RECENT DIASTOLIC BLOOD PRESSURE < 80 MM HG: ICD-10-PCS | Mod: CPTII,,, | Performed by: NURSE PRACTITIONER

## 2023-08-08 PROCEDURE — 1160F PR REVIEW ALL MEDS BY PRESCRIBER/CLIN PHARMACIST DOCUMENTED: ICD-10-PCS | Mod: CPTII,,, | Performed by: NURSE PRACTITIONER

## 2023-08-08 PROCEDURE — 1160F RVW MEDS BY RX/DR IN RCRD: CPT | Mod: CPTII,,, | Performed by: NURSE PRACTITIONER

## 2023-08-08 PROCEDURE — 3078F DIAST BP <80 MM HG: CPT | Mod: CPTII,,, | Performed by: NURSE PRACTITIONER

## 2023-08-08 PROCEDURE — 3008F PR BODY MASS INDEX (BMI) DOCUMENTED: ICD-10-PCS | Mod: CPTII,,, | Performed by: NURSE PRACTITIONER

## 2023-08-08 PROCEDURE — 1159F PR MEDICATION LIST DOCUMENTED IN MEDICAL RECORD: ICD-10-PCS | Mod: CPTII,,, | Performed by: NURSE PRACTITIONER

## 2023-08-08 PROCEDURE — 1159F MED LIST DOCD IN RCRD: CPT | Mod: CPTII,,, | Performed by: NURSE PRACTITIONER

## 2023-08-08 PROCEDURE — 99395 PREV VISIT EST AGE 18-39: CPT | Mod: S$PBB,,, | Performed by: NURSE PRACTITIONER

## 2023-08-08 PROCEDURE — 3008F BODY MASS INDEX DOCD: CPT | Mod: CPTII,,, | Performed by: NURSE PRACTITIONER

## 2023-08-08 PROCEDURE — 99213 OFFICE O/P EST LOW 20 MIN: CPT | Mod: PBBFAC | Performed by: NURSE PRACTITIONER

## 2023-08-08 PROCEDURE — 99395 PR PREVENTIVE VISIT,EST,18-39: ICD-10-PCS | Mod: S$PBB,,, | Performed by: NURSE PRACTITIONER

## 2023-08-08 PROCEDURE — 3074F PR MOST RECENT SYSTOLIC BLOOD PRESSURE < 130 MM HG: ICD-10-PCS | Mod: CPTII,,, | Performed by: NURSE PRACTITIONER

## 2023-08-08 NOTE — PROGRESS NOTES
"CC: Well woman exam    Mahamed Fernandez is a 22 y.o. female No obstetric history on file. presents for well woman exam.  LMP: Patient's last menstrual period was 07/20/2023..    First pap - teaching done  Pt has katina IUD placed 3/29/21 - discussed options -pt would like it removed and replaced at that time and will go with Kyleena IUD    Past Medical History:   Diagnosis Date    ADHD (attention deficit hyperactivity disorder)     Anxiety      Past Surgical History:   Procedure Laterality Date    ABDOMINAL HERNIA REPAIR N/A      Social History     Socioeconomic History    Marital status: Single   Tobacco Use    Smoking status: Never   Substance and Sexual Activity    Alcohol use: No    Drug use: No    Sexual activity: Yes     Partners: Male     Birth control/protection: I.U.D.     Family History   Problem Relation Age of Onset    Breast cancer Neg Hx     Colon cancer Neg Hx     Ovarian cancer Neg Hx      OB History    No obstetric history on file.         /68 (BP Location: Right arm, Patient Position: Sitting, BP Method: Medium (Manual))   Ht 5' 2.5" (1.588 m)   Wt 70.5 kg (155 lb 6.8 oz)   LMP 07/20/2023   BMI 27.97 kg/m²       ROS:  GENERAL: Denies weight gain or weight loss. Feeling well overall.   SKIN: Denies rash or lesions.   HEAD: Denies head injury or headache.   NODES: Denies enlarged lymph nodes.   CHEST: Denies chest pain or shortness of breath.   CARDIOVASCULAR: Denies palpitations or left sided chest pain.   ABDOMEN: No abdominal pain, constipation, diarrhea, nausea, vomiting or rectal bleeding.   URINARY: No frequency, dysuria, hematuria, or burning on urination.  REPRODUCTIVE: See HPI.   BREASTS: The patient performs breast self-examination and denies pain, lumps, or nipple discharge.   HEMATOLOGIC: No easy bruisability or excessive bleeding.   MUSCULOSKELETAL: Denies joint pain or swelling.   NEUROLOGIC: Denies syncope or weakness.   PSYCHIATRIC: Denies depression, anxiety or mood " swings.    PHYSICAL EXAM:  APPEARANCE: Well nourished, well developed, in no acute distress.  AFFECT: WNL, alert and oriented x 3  SKIN: No acne or hirsutism  NECK: Neck symmetric without masses or thyromegaly  NODES: No inguinal, cervical, axillary, or femoral lymph node enlargement  CHEST: Good respiratory effect  ABDOMEN: Soft.  No tenderness or masses.  No hepatosplenomegaly.  No hernias.  BREASTS: Symmetrical, no skin changes or visible lesions.  No palpable masses, nipple discharge bilaterally.  PELVIC: Normal external genitalia without lesions.  Normal hair distribution.  Adequate perineal body, normal urethral meatus.  Vagina moist and well rugated without lesions or discharge.  Cervix pink, without lesions, discharge or tenderness - IUD strings noted.   No significant cystocele or rectocele.  Bimanual exam shows uterus to be normal size, regular, mobile and nontender.  Adnexa without masses or tenderness.    EXTREMITIES: No edema.  Physical Exam    1. Encounter for gynecological examination without abnormal finding  Liquid-Based Pap Smear, Screening      2. Screening for STD (sexually transmitted disease)  C. trachomatis/N. gonorrhoeae by AMP DNA      3. IUD check up        4. Encounter for other general counseling and advice on contraception         AND PLAN:    Mahamed was seen today for contraception.    Diagnoses and all orders for this visit:    Encounter for gynecological examination without abnormal finding  -     Liquid-Based Pap Smear, Screening    Screening for STD (sexually transmitted disease)  -     C. trachomatis/N. gonorrhoeae by AMP DNA    IUD check up    Encounter for other general counseling and advice on contraception     Pt to reach out to me in January to order kyleena for removal and replacement by March 2024  Patient was counseled today on A.C.S. Pap guidelines and recommendations for yearly pelvic exams, mammograms and monthly self breast exams; to see her PCP for other health  maintenance.

## 2023-08-16 LAB
FINAL PATHOLOGIC DIAGNOSIS: NORMAL
Lab: NORMAL

## 2023-10-18 ENCOUNTER — PATIENT MESSAGE (OUTPATIENT)
Dept: PSYCHIATRY | Facility: CLINIC | Age: 23
End: 2023-10-18
Payer: MEDICAID

## 2023-11-03 ENCOUNTER — PATIENT MESSAGE (OUTPATIENT)
Dept: PSYCHIATRY | Facility: CLINIC | Age: 23
End: 2023-11-03
Payer: MEDICAID

## 2023-11-03 DIAGNOSIS — F41.1 GENERALIZED ANXIETY DISORDER: ICD-10-CM

## 2023-11-03 DIAGNOSIS — F31.62 MIXED BIPOLAR AFFECTIVE DISORDER, MODERATE: ICD-10-CM

## 2023-11-03 RX ORDER — OLANZAPINE 7.5 MG/1
7.5 TABLET ORAL NIGHTLY
Qty: 30 TABLET | Refills: 2 | Status: SHIPPED | OUTPATIENT
Start: 2023-11-03 | End: 2023-11-20 | Stop reason: SDUPTHER

## 2023-11-03 RX ORDER — FLUOXETINE HYDROCHLORIDE 40 MG/1
40 CAPSULE ORAL DAILY
Qty: 30 CAPSULE | Refills: 2 | Status: SHIPPED | OUTPATIENT
Start: 2023-11-03 | End: 2023-11-20 | Stop reason: SDUPTHER

## 2023-11-20 ENCOUNTER — OFFICE VISIT (OUTPATIENT)
Dept: PSYCHIATRY | Facility: CLINIC | Age: 23
End: 2023-11-20
Payer: MEDICAID

## 2023-11-20 DIAGNOSIS — F31.62 MIXED BIPOLAR AFFECTIVE DISORDER, MODERATE: Primary | ICD-10-CM

## 2023-11-20 DIAGNOSIS — R63.5 WEIGHT GAIN: ICD-10-CM

## 2023-11-20 DIAGNOSIS — F41.1 GENERALIZED ANXIETY DISORDER: ICD-10-CM

## 2023-11-20 PROCEDURE — 1159F PR MEDICATION LIST DOCUMENTED IN MEDICAL RECORD: ICD-10-PCS | Mod: CPTII,95,, | Performed by: PSYCHOLOGIST

## 2023-11-20 PROCEDURE — 1159F MED LIST DOCD IN RCRD: CPT | Mod: CPTII,95,, | Performed by: PSYCHOLOGIST

## 2023-11-20 PROCEDURE — 99215 PR OFFICE/OUTPT VISIT, EST, LEVL V, 40-54 MIN: ICD-10-PCS | Mod: HP,HB,95, | Performed by: PSYCHOLOGIST

## 2023-11-20 PROCEDURE — 99215 OFFICE O/P EST HI 40 MIN: CPT | Mod: HP,HB,95, | Performed by: PSYCHOLOGIST

## 2023-11-20 RX ORDER — FLUOXETINE HYDROCHLORIDE 40 MG/1
40 CAPSULE ORAL DAILY
Qty: 30 CAPSULE | Refills: 3 | Status: SHIPPED | OUTPATIENT
Start: 2023-11-20 | End: 2024-01-10 | Stop reason: DRUGHIGH

## 2023-11-20 RX ORDER — OLANZAPINE 2.5 MG/1
TABLET ORAL
Qty: 21 TABLET | Refills: 0 | Status: SHIPPED | OUTPATIENT
Start: 2023-11-20 | End: 2024-01-10

## 2023-11-20 RX ORDER — ONDANSETRON 4 MG/1
4 TABLET, ORALLY DISINTEGRATING ORAL EVERY 8 HOURS PRN
COMMUNITY
Start: 2023-11-04 | End: 2024-01-10

## 2023-11-20 NOTE — PATIENT INSTRUCTIONS

## 2023-11-20 NOTE — PROGRESS NOTES
Outpatient Psychiatry Follow-Up Visit     11/20/2023    Timeframe: Corona Virus Outbreak     The patient location is: Patient's home/ Patient reported that his/her location at the time of this visit was in the [facility]     Visit type: Virtual visit with synchronous audio and video     Each patient to whom he or she provides medical services by telehealth is: (1) informed of the relationship between the medical psychologist and patient and the respective role of any other health care provider with respect to management of the patient; and (2) notified that he or she may decline to receive medical services by telehealth and may withdraw from such care at any time.    I also informed patient of the following:   Milli Medina, PhD, MPAP:  [place] medical license number: [id number]    My contact info:  [facility]nd [facility]  [address] [address]   Ph: [phone]    If technology issues, call office phone: Ph: [phone]  If crisis: Dial 911 or go to nearest Emergency Room (ER)  If questions related to privacy practices: contact [facility] Information Department: [phone]  Clinical Status of Patient:  Outpatient (Ambulatory)    Chief Complaint:  Mahamed Fernandez is a 23 y.o. female who presents today for follow-up of mood and anxiety.      Impressions/Plan from last visit: Tiffanie (mom) and Mahamed attended her visit. She is doing well--forgets her medicine sometimes but has done fairly well. 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 She is busy and enjoys it. She has dyed her hair hot pink--she said that it was purple a couple of weeks ago. She is in full makeup, too. She does believe that her medicine is working--mom also noted that this is the best she has been in a long time. She reported that she is eating at night on the medicine--we discussed decreasing but she is hesitant to do that. For now, we agreed to continue her  "medicines as prescribed. She needs labs--will put in order. There is some weight gain--will need to monitor.    Plan--continue Prozac 40 mg, Zyprexa 7.5 mg; labs (CBC, CMP, lipid panel, TSH)    Interval History and Content of Current Session: Mahamed attended her virtual visit. She was last seen by me in 2023, almost a year ago. She said that her insurance changed but she was unable to make the change online. She was given the billing contact information to further consult. She has started a new job ([employer])--has a new boyfriend. They have been dating for 4 months--they are living together (recently moved in within the last month). She said that she is doing well--likes her medicines. She reported that she feels "normal" on the medicines. She has had significant weight gain, however--we discussed cross-titrating to Caplyta and will continue to monitor. See plan below.    Plan--continue Prozac 40 mg,  Zyprexa to 5 mg for one week, while starting Caplyta 21 mg; then decrease Zyprexa to 2.5 mg for one week, while increasing Caplyta to 42 mg; then stop Zyprexa and continue Caplyta 42 mg    -------------------------------------------------------------------------------------------------------------  Prior medicines: Lamictal, Abilify and Abilify Maintena, Vraylar (great 1.5 mg but dystonic reaction at 3 mg), Zyprexa, Concerta, Intuniv, Vyvanse (angry), Ritalin, clonidine, Wellbutrin, Zoloft, Prozac, Lexapro.         2023     8:27 AM 10/17/2022    12:56 PM 2022     9:56 AM   GAD7   1. Feeling nervous, anxious, or on edge? 0 1 0   2. Not being able to stop or control worrying? 0 1 0   3. Worrying too much about different things? 0 1 0   4. Trouble relaxing? 0 1 0   5. Being so restless that it is hard to sit still? 0 0 0   6. Becoming easily annoyed or irritable? 0 1 0   7. Feeling afraid as if something awful might happen? 0 1 0   VIELKA-7 Score 0 6 0      0-4 = Minimal anxiety  5-9 " = Mild anxiety  10-14 = Moderate anxiety  15-21 = Severe anxiety       Review of Systems   PSYCHIATRIC: Pertinant items are noted in the narrative.    Past Medical, Family and Social History: The patient's past medical, family and social history have been reviewed and updated as appropriate within the electronic medical record - see encounter notes.      Current Outpatient Medications:     ondansetron (ZOFRAN-ODT) 4 MG TbDL, Take 4 mg by mouth every 8 (eight) hours as needed (nausea)., Disp: , Rfl:     FLUoxetine 40 MG capsule, Take 1 capsule (40 mg total) by mouth once daily., Disp: 30 capsule, Rfl: 3    lumateperone 21 mg Cap, Take 21 mg by mouth once daily for 7 days, THEN 42 mg once daily for 7 days., Disp: 21 capsule, Rfl: 0    [START ON 12/4/2023] lumateperone 42 mg Cap, Take 42 mg by mouth once daily., Disp: 30 capsule, Rfl: 2    OLANZapine (ZYPREXA) 2.5 MG tablet, Take 2 tablets (5 mg total) by mouth every evening for 7 days, THEN 1 tablet (2.5 mg total) every evening for 7 days., Disp: 21 tablet, Rfl: 0    Current Facility-Administered Medications:     levonorgestreL 14 mcg/24 hrs (3 yrs) 13.5 mg IUD 14 mcg, 14 mcg, Intrauterine, , Arina Ruiz MD, 14 mcg at 03/29/21 1300    Compliance: yes    Side effects: see above    Risk Parameters:  Patient reports no suicidal ideation  Patient reports no homicidal ideation  Patient reports no self-injurious behavior  Patient reports no violent behavior    Exam (detailed: at least 9 elements; comprehensive: all 15 elements)   Constitutional    Wt Readings from Last 10 Encounters:   08/08/23 70.5 kg (155 lb 6.8 oz)   01/18/23 60.9 kg (134 lb 4.2 oz)   06/15/22 54.9 kg (121 lb 0.5 oz)   03/15/22 55.3 kg (121 lb 14.6 oz)   03/29/21 59.4 kg (130 lb 15.3 oz)   12/23/20 54.6 kg (120 lb 5.9 oz)   11/25/20 51.7 kg (113 lb 15.7 oz)   11/18/20 50.3 kg (110 lb 14.3 oz)   10/21/20 46 kg (101 lb 6.6 oz)   07/20/19 42.3 kg (93 lb 4.1 oz)     Vitals:  Most recent vital signs  "were reviewed.   Last 3 sets of Vitals        6/15/2022     9:06 AM 2023     8:28 AM 2023     8:32 AM   Vitals - 1 value per visit   SYSTOLIC 105 115 120   DIASTOLIC 71 72 68   Pulse 80 82    Weight (lb) 121.03 134.26 155.42   Weight (kg) 54.9 60.9 70.5   Height 5' 2.5" (1.588 m)  5' 2.5" (1.588 m)   BMI (Calculated) 21.8  28          General:  age appropriate, casually dressed, neatly groomed, has cut/styled hair--blonde; wearing makeup; got braces     Musculoskeletal  Muscle Strength/Tone:  no tremor, no tic   Gait & Station:  video visit     Psychiatric  Speech:  no latency; no press   Behavior: wnl   Mood & Affect:  happy  congruent and appropriate   Thought Process:  normal and logical   Associations:  intact   Thought Content:  normal, no suicidality, no homicidality, delusions, or paranoia   Insight:  has awareness of illness   Judgement: behavior is adequate to circumstances   Orientation:  grossly intact   Memory: intact for content of interview   Language: grossly intact   Attention Span & Concentration:  Grossly intact   Fund of Knowledge:  intact and appropriate to age and level of education     Assessment and Diagnosis   Status/Progress: Based on the examination today, the patient's problem(s) is/are fairly well controlled.  New problems have been presented today.   Co-morbidities and side effects (weight gain)  are complicating management of the primary condition.  There are no active rule-out diagnoses for this patient at this time.     General Impression:     Encounter Diagnoses   Name Primary?    Mixed bipolar affective disorder, moderate Yes    Generalized anxiety disorder     Weight gain        Intervention/Counseling/Treatment Plan   Medication Management: Discussed risks, benefits, and alternatives to treatment plan documented above with patient. I answered all patient questions related to this plan, and patient expressed understanding and agreement.   continue Prozac 40 mg,  " Zyprexa to 5 mg for one week, while starting Caplyta 21 mg; then decrease Zyprexa to 2.5 mg for one week, while increasing Caplyta to 42 mg; then stop Zyprexa and continue Caplyta 42 mg  continue therapy        Return to Clinic: 3 months     Medication List with Changes/Refills   New Medications    LUMATEPERONE 21 MG CAP    Take 21 mg by mouth once daily for 7 days, THEN 42 mg once daily for 7 days.    LUMATEPERONE 42 MG CAP    Take 42 mg by mouth once daily.   Current Medications    ONDANSETRON (ZOFRAN-ODT) 4 MG TBDL    Take 4 mg by mouth every 8 (eight) hours as needed (nausea).   Changed and/or Refilled Medications    Modified Medication Previous Medication    FLUOXETINE 40 MG CAPSULE FLUoxetine 40 MG capsule       Take 1 capsule (40 mg total) by mouth once daily.    Take 1 capsule (40 mg total) by mouth once daily.    OLANZAPINE (ZYPREXA) 2.5 MG TABLET OLANZapine (ZYPREXA) 7.5 MG tablet       Take 2 tablets (5 mg total) by mouth every evening for 7 days, THEN 1 tablet (2.5 mg total) every evening for 7 days.    Take 1 tablet (7.5 mg total) by mouth every evening.        Time spent with pt including note preparation: 27 minutes     Milli Medina, PhD, MP  Advanced Practice Medical Psychologist  [facility]  [place] [address]  [phone] ph  [phone] fax

## 2023-12-29 ENCOUNTER — PATIENT MESSAGE (OUTPATIENT)
Dept: OBSTETRICS AND GYNECOLOGY | Facility: CLINIC | Age: 23
End: 2023-12-29
Payer: MEDICAID

## 2024-01-04 NOTE — PROGRESS NOTES
Individual Psychotherapy Follow-up Visit Progress Note (PhD/LCSW)     Outpatient Psychotherapy - 60 minutes with patient (53 minutes or more) - [id number]    Date: 1/9/2024    Visit Type: Telehealth    Due to the nature of this visit type, a virtual visit with synchronous audio and video, each patient to whom this provider administers behavioral health services by telemedicine is: (1) informed of the relationship between the provider and patient and the respective role of any other health care provider with respect to management of the patient; and (2) notified that he or she may decline to receive services by telemedicine and may withdraw from such care at any time. If technological issues occur, at the professional discretion of the clinical provider, synchronous audio only services may be utilized after unsuccessful attempt(s) to connect via audiovisual services; similarly, if audio only visit occurs, patient's verbal consent will be obtained prior to receipt of service. Prevailing clinical standards of care are upheld despite service methodology; having said this, if the clinical provider is unable to meet the prevailing standards of care, the patient will be rescheduled for the provider's soonest availability - as clinically appropriate.     The patient was informed of the following:     Provider's contact info:  [facility]  [address], [address]  [place] [address]  (Phone) [phone]    If technology issues occur, call office phone: Ph: [phone]  If crisis: Dial 911 or go to nearest Emergency Room (ER)  If questions related to privacy practices: contact [facility] Information Department: [phone]    For security purposes, the pt identified that they were at [id number] [place] [id number] during today's session and contact number is [phone].    The pt's emergency contact(s) is Extended Emergency Contact  "Information  Primary Emergency Contact: shelia steen  Address: [address] [place] [address] [facility]  Home Phone: [phone]  Mobile Phone: [phone]  Relation: Mother  Secondary Emergency Contact: Umer Fernandez  Address: [address] [address] [facility]  Home Phone: [phone]  Mobile Phone: [phone]  Relation: Father.    Crisis Disclaimer: Patient was informed that due to the virtual nature of the visit, that if a crisis develops, protocols will be implemented to ensure patient safety, including but not limited to: 1) Initiating a welfare check with local law enforcement and/or 2) Calling 911    1/9/2024  MRN: [id number]  Primary Care Provider: Adele Tsang MD    Mahamed Fernandez is a 23 y.o. female who presents today for follow-up of mood disorder and anxiety. Met with patient.      Preferred Name: Mahamed   Transgender Identity Form    Gender Identity Form  Gender Identity: cisgender female  Sex at Birth: female  Patient Pronouns: she/her/hers  Transition Summary         Subjective:     Last encounter (with this provider): 9/26/2022     Content of Current Session: Pt reported, "I'm ok" upon entry to this session. LCSW utilized active listening/reflection to engage with pt and review experiences since their last session with this provider. Pt reported "I feel like I was a little manic these days because my thoughts have been all over the place and I'm wanting to make impulsive decisions." Pt reported she contacted her medical psychologist about her symptoms and noted her medications were appropriately adjusted. Pt cited "I feel better since she fixed my meds and I'm following up with her soon." Pt reported she has been experiencing stress within her relationship with her dad and noted "I think it's because I was living with him full time for the first time ever and he gives me a hard time about taking my " "medications or really anything to do with my bipolar disorder because he doesn't understand it - he thinks I can just deal with it myslef but I know I do so much better with my medications and using my coping skills." Pt reported utilizing her support system (ie, boyfriend, best friend, mother), expressive arts (ie, drawing, makeup design), and mindfulness (ie, auditory grounding and deep breathing) to successfully navigate her mental health symptoms. Pt reported "other than that with my dad - I'm in a really good spot these days." LCSW utilized cognitive processing and supportive therapy. LCSW utilized introspective therapy to support pt's self-reflection. LCSW utilized reality orientation to support pt's objectivity, reframed perspectives, and thought challenging processes. LCSW utilized introspective therapy, interpersonal therapy, and reality orientation to support pt's objective reflection of her historical relationship with her dad. LCSW utilized interpersonal therapy to reinforce pt's communication skills and their use in education, boundary setting, and self-advocacy. LCSW utilized psychoeducation to review pt's historical coping skills. LCSW utilized compassion focused therapy to support pt's self-acceptance. LCSW utilized person centered and strengths based perspectives to further empower and edify pt. LCSW and pt practiced aforementioned skills in session. LCSW provided pt with supplemental resources via [place]. Pt responded appropriately to aforementioned interventions. Pt denied SI/HI/AVH.     Therapeutic Interventions Utilized During Current Session: Cognitive Processing Therapy, Cognitive Behavioral Therapy, Compassion-Focused Therapy, Interpersonal Psychotherapy, Introspective Therapy, Person-Centered Therapy, Psychoeducation, Reality Therapy, Strength-Based Therapy, Supportive Therapy        11/20/2023     8:27 AM 10/17/2022    12:56 PM 9/26/2022     9:56 AM 7/13/2022     7:57 AM 5/30/2022 "    10:02 AM 5/25/2022     4:59 PM 4/12/2022     9:53 AM   GAD7   1. Feeling nervous, anxious, or on edge? 0 1 0 0 1 1 0   2. Not being able to stop or control worrying? 0 1 0 0 1 1 2   3. Worrying too much about different things? 0 1 0 0 1 1 2   4. Trouble relaxing? 0 1 0 0 1 1 2   5. Being so restless that it is hard to sit still? 0 0 0 0 1 1 0   6. Becoming easily annoyed or irritable? 0 1 0 0 1 1 0   7. Feeling afraid as if something awful might happen? 0 1 0 0 1 1 0   VIELKA-7 Score 0 6 0 0 7 7 6      0-4 = Minimal anxiety  5-9 = Mild anxiety  10-14 = Moderate anxiety  15-21 = Severe anxiety         9/26/2022     9:56 AM 7/13/2022     7:57 AM 5/30/2022    10:03 AM 5/25/2022     4:59 PM 3/29/2022     7:58 AM 1/10/2022     8:45 AM 7/19/2021     8:00 AM   PHQ-9 Depression Patient Health Questionnaire   Patient agreed to terms: Yes Yes Yes Yes Yes Yes Yes   Little interest or pleasure in doing things 0 0 1 0 0 0 0   Feeling down, depressed, or hopeless 0 0 1 0 0 0 0   Trouble falling or staying asleep, or sleeping too much 0 0 1 3 0 0 0   Feeling tired or having little energy 0 0 1 0 0 0 0   Poor appetite or overeating 0 0 1 3 0 0 0   Feeling bad about yourself - or that you are a failure or have let yourself or your family down 0 0 1 0 0 0 2   Trouble concentrating on things, such as reading the newspaper or watching television 0 0 1 0 0 0 2   Moving or speaking so slowly that other people could have noticed. Or the opposite - being so fidgety or restless that you have been moving around a lot more than usual 0 0 1 0 0 0 0   Thoughts that you would be better off dead, or of hurting yourself in some way 0 0 1 0 0 0 0   PHQ-9 Total Score 0 0 9 6 0 0 4   If you checked off any problems, how difficult have these problems made it for you to do your work, take care of things at home, or get along with other people? Not difficult at all Not difficult at all Somewhat difficult Somewhat difficult Not difficult at all Not  difficult at all Not difficult at all   Interpretation Minimal or None Minimal or None Mild Mild Minimal or None Minimal or None      0-4 = No intervention  5 to 9 = Mild  10 to 14 = Moderate  15 to 19 = Moderately severe  ?20 = Severe      Objective:       Mental Status Evaluation  Appearance: unremarkable, age appropriate  Behavior: normal, cooperative  Speech: normal tone, normal rate, normal pitch, normal volume  Mood: euthymic  Affect: congruent and appropriate  Thought Process: normal and logical  Thought Content: normal, no suicidality, no homicidality, delusions, or paranoia  Sensorium: grossly intact  Cognition: grossly intact  Insight: intact  Judgment: adequate to circumstances    Risk parameters:  Patient reports no suicidal ideation  Patient reports no homicidal ideation  Patient reports no self-injurious behavior  Patient reports no violent behavior      Assessment & Plan:     The patient's response to the interventions is accepting    The patient's progress toward treatment goals is fair     Homework assigned: none     Treatment plan:   A. Target symptoms: Anxiety and Mood Disorder   B. Therapeutic modalities: insight oriented psychotherapy, behavior modifying psychotherapy, supportive psychotherapy  C. Why chosen therapy is appropriate versus another modality: relevant to diagnosis, patient responds to this modality, evidence based practice   D. Outcome monitoring methods: self report, observation, rating scales, feedback from clinical staff      Visit Diagnosis:   1. Mixed bipolar affective disorder, moderate    2. Generalized anxiety disorder    3. Attention deficit hyperactivity disorder (ADHD), unspecified ADHD type        Follow-up: individual psychotherapy    Return to Clinic: 3 weeks  Pt Reported to Schedule Self via Epic EMR MyChart Application and/or Department Support Staff          Shruti Cox LCSW  1/9/2024  4:00 PM

## 2024-01-05 ENCOUNTER — TELEPHONE (OUTPATIENT)
Dept: PSYCHIATRY | Facility: CLINIC | Age: 24
End: 2024-01-05
Payer: MEDICAID

## 2024-01-08 ENCOUNTER — PATIENT MESSAGE (OUTPATIENT)
Dept: PSYCHIATRY | Facility: CLINIC | Age: 24
End: 2024-01-08
Payer: MEDICAID

## 2024-01-09 ENCOUNTER — OFFICE VISIT (OUTPATIENT)
Dept: PSYCHIATRY | Facility: CLINIC | Age: 24
End: 2024-01-09
Payer: MEDICAID

## 2024-01-09 ENCOUNTER — PATIENT MESSAGE (OUTPATIENT)
Dept: PSYCHIATRY | Facility: CLINIC | Age: 24
End: 2024-01-09
Payer: MEDICAID

## 2024-01-09 DIAGNOSIS — F41.1 GENERALIZED ANXIETY DISORDER: ICD-10-CM

## 2024-01-09 DIAGNOSIS — F31.62 MIXED BIPOLAR AFFECTIVE DISORDER, MODERATE: Primary | ICD-10-CM

## 2024-01-09 DIAGNOSIS — F90.9 ATTENTION DEFICIT HYPERACTIVITY DISORDER (ADHD), UNSPECIFIED ADHD TYPE: ICD-10-CM

## 2024-01-09 PROCEDURE — 90837 PSYTX W PT 60 MINUTES: CPT | Mod: AJ,HB,95, | Performed by: SOCIAL WORKER

## 2024-01-10 ENCOUNTER — PATIENT MESSAGE (OUTPATIENT)
Dept: PSYCHIATRY | Facility: CLINIC | Age: 24
End: 2024-01-10
Payer: MEDICAID

## 2024-01-10 ENCOUNTER — OFFICE VISIT (OUTPATIENT)
Dept: PSYCHIATRY | Facility: CLINIC | Age: 24
End: 2024-01-10
Payer: MEDICAID

## 2024-01-10 VITALS
DIASTOLIC BLOOD PRESSURE: 73 MMHG | BODY MASS INDEX: 29.16 KG/M2 | WEIGHT: 162.06 LBS | HEART RATE: 78 BPM | SYSTOLIC BLOOD PRESSURE: 112 MMHG

## 2024-01-10 DIAGNOSIS — F41.1 GENERALIZED ANXIETY DISORDER: ICD-10-CM

## 2024-01-10 DIAGNOSIS — F31.60 BIPOLAR I DISORDER, MOST RECENT EPISODE (OR CURRENT) MIXED: Primary | ICD-10-CM

## 2024-01-10 PROCEDURE — 99999 PR PBB SHADOW E&M-EST. PATIENT-LVL II: CPT | Mod: PBBFAC,HB,, | Performed by: PSYCHOLOGIST

## 2024-01-10 PROCEDURE — 3008F BODY MASS INDEX DOCD: CPT | Mod: CPTII,,, | Performed by: PSYCHOLOGIST

## 2024-01-10 PROCEDURE — 90833 PSYTX W PT W E/M 30 MIN: CPT | Mod: HP,HB,S$PBB, | Performed by: PSYCHOLOGIST

## 2024-01-10 PROCEDURE — 1159F MED LIST DOCD IN RCRD: CPT | Mod: CPTII,,, | Performed by: PSYCHOLOGIST

## 2024-01-10 PROCEDURE — 99214 OFFICE O/P EST MOD 30 MIN: CPT | Mod: HP,HB,S$PBB, | Performed by: PSYCHOLOGIST

## 2024-01-10 PROCEDURE — 99212 OFFICE O/P EST SF 10 MIN: CPT | Mod: PBBFAC | Performed by: PSYCHOLOGIST

## 2024-01-10 PROCEDURE — 3074F SYST BP LT 130 MM HG: CPT | Mod: CPTII,,, | Performed by: PSYCHOLOGIST

## 2024-01-10 PROCEDURE — 3078F DIAST BP <80 MM HG: CPT | Mod: CPTII,,, | Performed by: PSYCHOLOGIST

## 2024-01-10 RX ORDER — FLUOXETINE HYDROCHLORIDE 40 MG/1
80 CAPSULE ORAL DAILY
Qty: 60 CAPSULE | Refills: 3 | Status: SHIPPED | OUTPATIENT
Start: 2024-01-10 | End: 2024-02-20 | Stop reason: SDUPTHER

## 2024-01-10 NOTE — PATIENT INSTRUCTIONS

## 2024-01-10 NOTE — PROGRESS NOTES
"Outpatient Psychiatry Follow-Up Visit     1/10/2024    Clinical Status of Patient:  Outpatient (Ambulatory)    Chief Complaint:  Mahamed Fernandez is a 23 y.o. female who presents today for follow-up of mood and anxiety.      Impressions/Plan from last visit: Mahamed attended her virtual visit. She was last seen by me in 2023, almost a year ago. She said that her insurance changed but she was unable to make the change online. She was given the billing contact information to further consult. She has started a new job ([employer])--has a new boyfriend. They have been dating for 4 months--they are living together (recently moved in within the last month). She said that she is doing well--likes her medicines. She reported that she feels "normal" on the medicines. She has had significant weight gain, however--we discussed cross-titrating to Caplyta and will continue to monitor. See plan below.    Plan--continue Prozac 40 mg,  Zyprexa to 5 mg for one week, while starting Caplyta 21 mg; then decrease Zyprexa to 2.5 mg for one week, while increasing Caplyta to 42 mg; then stop Zyprexa and continue Caplyta 42 mg    Interval History and Content of Current Session: Mahamed and Umer (dad) attended her visit. Since we last met, she messaged that she was manic/hypomanic, and we agreed to have her take Zyprexa with the Caplyta. She messaged a couple of days later, saying that she felt better. She wanted to help dad better understand her bipolar and medicines.    Last week (last Friday), she was more irritable--then she was more anxious and had panic attacks and was tearful. She described "freaking out" and wanting to break up with her boyfriend--other impulsive decisions that she would not normally want. On Monday, she talked to her mom and reached out.  As we talked about her medications, however, Mahamed had misunderstood and doubled her Prozac and Caplyta.  She no longer had the Zyprexa and was not " taking it.  She did report feeling more tired, and initially I thought that was because of the Zyprexa.  We agreed to continue her Prozac at the higher dose and will go back to 42 mg of Caplyta.  We will follow-up in 6 weeks, and if she continues with fatigue, we will adjust accordingly.  She was praised for noticing the change in her state and reaching out for assistance before it became a crisis.  We spent some time discussing the nature of her bipolar and anxiety and consistency and compliance with medication.  Mahamed reported that she has already lost some weight, though we do not have that reading from her highest point in our system.  We will continue to monitor this.  Mahamed denied any involuntary movements, and none were observed during this visit. AIMS (modified) completed.    Plan--continue Caplyta 42 mg; increase Prozac 80 mg    PSYCHOTHERAPY      Site: [facility]  Time: 30 minutes  Participants: Met with patient and father    Therapeutic Intervention Type: behavior modifying psychotherapy, supportive psychotherapy  Why chosen therapy is appropriate versus another modality: patient responds to this modality, evidence based practice    Target symptoms: anxiety , mood disorder  Primary focus: see above    Outcome monitoring methods: self-report, observation, feedback from family    Patient's response to intervention:  The patient's response to intervention is accepting.    Progress toward goals:  The patient's progress toward goals is good.  -------------------------------------------------------------------------------------------------------------  Prior medicines: Lamictal, Abilify and Abilify Maintena, Vraylar (great 1.5 mg but dystonic reaction at 3 mg), Zyprexa, Concerta, Intuniv, Vyvanse (angry), Ritalin, clonidine, Wellbutrin, Zoloft, Prozac, Lexapro.         1/9/2024     3:51 PM 11/20/2023     8:27 AM 10/17/2022    12:56 PM   GAD7   1. Feeling nervous, anxious, or on edge? 0 0 1   2.  Not being able to stop or control worrying? 0 0 1   3. Worrying too much about different things? 0 0 1   4. Trouble relaxing? 0 0 1   5. Being so restless that it is hard to sit still? 0 0 0   6. Becoming easily annoyed or irritable? 0 0 1   7. Feeling afraid as if something awful might happen? 0 0 1   VIELKA-7 Score 0 0 6      0-4 = Minimal anxiety  5-9 = Mild anxiety  10-14 = Moderate anxiety  15-21 = Severe anxiety       AIMS:   Abnormal Involuntary Movement Scale (highest severity observed) 0   Muscles of Facial Expression  0   Lips and Perioral Area  0   Jaw  0   Tongue  0   Upper (arms, wrists, hands, fingers)  0   Lower (legs, knees, ankles, toes)  0   Neck, shoulders, hips  0   Severity of abnormal movements (highest score from questions above)  0   Incapacitation due to abnormal movements  0   Patient's awareness of abnormal movements (rate only patient's report)  0   Current problems with teeth and/or dentures?   no   Does patient usually wear dentures?  no     No awareness-0   Aware, no distress-1   Aware, mild distress-2   Aware, moderate distress-3   Aware, severe distress-4      Review of Systems   PSYCHIATRIC: Pertinant items are noted in the narrative.    Past Medical, Family and Social History: The patient's past medical, family and social history have been reviewed and updated as appropriate within the electronic medical record - see encounter notes.      Current Outpatient Medications:     FLUoxetine 40 MG capsule, Take 2 capsules (80 mg total) by mouth once daily., Disp: 60 capsule, Rfl: 3    lumateperone 42 mg Cap, Take 42 mg by mouth once daily., Disp: 30 capsule, Rfl: 2    Current Facility-Administered Medications:     levonorgestreL 14 mcg/24 hrs (3 yrs) 13.5 mg IUD 14 mcg, 14 mcg, Intrauterine, , Arina Ruiz MD, 14 mcg at 03/29/21 1300    Compliance: yes    Side effects: see above    Risk Parameters:  Patient reports no suicidal ideation  Patient reports no homicidal ideation  Patient  "reports no self-injurious behavior  Patient reports no violent behavior    Exam (detailed: at least 9 elements; comprehensive: all 15 elements)   Constitutional    Wt Readings from Last 10 Encounters:   01/10/24 73.5 kg (162 lb 0.6 oz)   08/08/23 70.5 kg (155 lb 6.8 oz)   01/18/23 60.9 kg (134 lb 4.2 oz)   06/15/22 54.9 kg (121 lb 0.5 oz)   03/15/22 55.3 kg (121 lb 14.6 oz)   03/29/21 59.4 kg (130 lb 15.3 oz)   12/23/20 54.6 kg (120 lb 5.9 oz)   11/25/20 51.7 kg (113 lb 15.7 oz)   11/18/20 50.3 kg (110 lb 14.3 oz)   10/21/20 46 kg (101 lb 6.6 oz)     Vitals:  Most recent vital signs were reviewed.   Last 3 sets of Vitals        1/18/2023     8:28 AM 8/8/2023     8:32 AM 1/10/2024     3:06 PM   Vitals - 1 value per visit   SYSTOLIC 115 120 112   DIASTOLIC 72 68 73   Pulse 82  78   Weight (lb) 134.26 155.42 162.04   Weight (kg) 60.9 70.5 73.5   Height  5' 2.5" (1.588 m)    BMI (Calculated)  28           General:  age appropriate, casually dressed, neatly groomed, has cut/styled hair--blonde; wearing makeup; got braces     Musculoskeletal  Muscle Strength/Tone:  no tremor, no tic   Gait & Station:  non-ataxic     Psychiatric  Speech:  no latency; no press   Behavior: wnl   Mood & Affect:  better  congruent and appropriate   Thought Process:  normal and logical   Associations:  intact   Thought Content:  normal, no suicidality, no homicidality, delusions, or paranoia   Insight:  has awareness of illness   Judgement: behavior is adequate to circumstances   Orientation:  grossly intact   Memory: intact for content of interview   Language: grossly intact   Attention Span & Concentration:  Grossly intact   Fund of Knowledge:  intact and appropriate to age and level of education     Assessment and Diagnosis   Status/Progress: Based on the examination today, the patient's problem(s) is/are improved and adequately but not ideally controlled.  New problems have been presented today.   Co-morbidities and side effects (weight " gain) and psychosocial stressors  are complicating management of the primary condition.  There are no active rule-out diagnoses for this patient at this time.     General Impression:     Encounter Diagnoses   Name Primary?    Bipolar I disorder, most recent episode (or current) mixed Yes    Generalized anxiety disorder        Intervention/Counseling/Treatment Plan   Medication Management: Discussed risks, benefits, and alternatives to treatment plan documented above with patient. I answered all patient questions related to this plan, and patient expressed understanding and agreement.   continue Prozac 40 mg,  Zyprexa to 5 mg for one week, while starting Caplyta 21 mg; then decrease Zyprexa to 2.5 mg for one week, while increasing Caplyta to 42 mg; then stop Zyprexa and continue Caplyta 42 mg  continue therapy        Return to Clinic: 6 weeks     Medication List with Changes/Refills   Current Medications    LUMATEPERONE 42 MG CAP    Take 42 mg by mouth once daily.   Changed and/or Refilled Medications    Modified Medication Previous Medication    FLUOXETINE 40 MG CAPSULE FLUoxetine 40 MG capsule       Take 2 capsules (80 mg total) by mouth once daily.    Take 1 capsule (40 mg total) by mouth once daily.   Discontinued Medications    OLANZAPINE (ZYPREXA) 2.5 MG TABLET    Take 2 tablets (5 mg total) by mouth every evening for 7 days, THEN 1 tablet (2.5 mg total) every evening for 7 days.    ONDANSETRON (ZOFRAN-ODT) 4 MG TBDL    Take 4 mg by mouth every 8 (eight) hours as needed (nausea).          I spent an additional 16 minutes performing E/M services with >50% spent on counseling, guidance, coordinating care (not Psychotherapy related) in addition to the 30 minutes performing Psychotherapy.    Time spent with pt including note preparation: 46 minutes     Milli Medina, PhD, MP  Advanced Practice Medical Psychologist  [facility]  [address]  [place] [address]  [phone]    [phone] fax

## 2024-01-24 DIAGNOSIS — F31.60 BIPOLAR I DISORDER, MOST RECENT EPISODE (OR CURRENT) MIXED: Primary | ICD-10-CM

## 2024-01-24 RX ORDER — LAMOTRIGINE 25 MG/1
TABLET ORAL
Qty: 42 TABLET | Refills: 0 | Status: SHIPPED | OUTPATIENT
Start: 2024-01-24 | End: 2024-02-20 | Stop reason: SDUPTHER

## 2024-02-09 ENCOUNTER — PATIENT MESSAGE (OUTPATIENT)
Dept: OBSTETRICS AND GYNECOLOGY | Facility: CLINIC | Age: 24
End: 2024-02-09
Payer: MEDICAID

## 2024-02-09 ENCOUNTER — PATIENT MESSAGE (OUTPATIENT)
Dept: PSYCHIATRY | Facility: CLINIC | Age: 24
End: 2024-02-09
Payer: MEDICAID

## 2024-02-09 DIAGNOSIS — Z97.5 CONTRACEPTION, DEVICE INTRAUTERINE: Primary | ICD-10-CM

## 2024-02-12 NOTE — PROGRESS NOTES
Individual Psychotherapy Follow-up Visit Progress Note (PhD/LCSW)     Outpatient Psychotherapy - 30 minutes with patient (16-37 minutes) - [id number]    Date: 2/20/2024    Visit Type: Telehealth    Due to the nature of this visit type, a virtual visit with synchronous audio and video, each patient to whom this provider administers behavioral health services by telemedicine is: (1) informed of the relationship between the provider and patient and the respective role of any other health care provider with respect to management of the patient; and (2) notified that he or she may decline to receive services by telemedicine and may withdraw from such care at any time. If technological issues occur, at the professional discretion of the clinical provider, synchronous audio only services may be utilized after unsuccessful attempt(s) to connect via audiovisual services; similarly, if audio only visit occurs, patient's verbal consent will be obtained prior to receipt of service. Prevailing clinical standards of care are upheld despite service methodology; having said this, if the clinical provider is unable to meet the prevailing standards of care, the patient will be rescheduled for the provider's soonest availability - as clinically appropriate.     The patient was informed of the following:     Provider's contact info:  [facility]  [address], [address], [address]  [place] [address]  (Phone) [phone]    If technology issues occur, call office phone: Ph: [phone]  If crisis: Dial 911 or go to nearest Emergency Room (ER)  If questions related to privacy practices: contact [facility] Information Department: [phone]    For security purposes, the pt identified that they were at [id number] [place] [id number] during today's session and contact number is [phone].    The pt's emergency contact(s) is Extended Emergency Contact Information  Primary  "Emergency Contact: shelia steen  Address: [address]           [place] [address] [facility]  Home Phone: [phone]  Mobile Phone: [phone]  Relation: Mother  Secondary Emergency Contact: Umer Fernandez  Address: [address] [address] [facility]  Home Phone: [phone]  Mobile Phone: [phone]  Relation: Father.    Crisis Disclaimer: Patient was informed that due to the virtual nature of the visit, that if a crisis develops, protocols will be implemented to ensure patient safety, including but not limited to: 1) Initiating a welfare check with local law enforcement and/or 2) Calling 911    2/20/2024  MRN: [id number]  Primary Care Provider: Adele Tsang MD    Mahamed Fernandez is a 23 y.o. female who presents today for follow-up of mood disorder and anxiety. Met with patient.      Preferred Name: Mahamed   Transgender Identity Form    Gender Identity Form  Gender Identity: cisgender female  Sex at Birth: female  Patient Pronouns: she/her/hers  Transition Summary         Subjective:     Last encounter (with this provider): 1/9/2024     Content of Current Session: Pt reported, "I'm ok" upon entry to this session. LCSW utilized active listening/reflection to engage with pt and review experiences since their last session with this provider. Pt reported "I think I'm maniac and my mom does too." Pt reported an increase in anxiety, insomnia, poor concentration/racing thoughts, and goal directed behaviors over the last 3 weeks. 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 Pt denied SI/HI/AVH. LCSW utilized cognitive processing and supportive therapy. LCSW utilized introspective therapy to support pt's self-reflection. LCSW utilized reality orientation to support pt's objectivity, " reframed perspectives, and thought challenging processes. LCSW utilized psychoeducation to discuss safety planning (ie, 988, 911, support system). LCSW utilized psychoeducation to discuss pt's emotional regulation, environmental stressors, and their relationship with manic episodes. LCSW utilized psychoeducation to review self-care as a supportive skill. LCSW utilized mindfulness based therapy to reinforce pt's understanding of her mindfulness skills and their application in reduction of her breakthrough symptoms.  LCSW utilized interpersonal therapy to support pt's understanding and application of her support system in personal self-awareness and reduction of her breakthrough symptom acuity. LCSW utilized person centered and strengths based perspectives to further empower and edify pt. LCSW and pt practiced aforementioned skills in session. Pt responded appropriately to aforementioned interventions. Pt denied SI/HI/AVH.     Therapeutic Interventions Utilized During Current Session: Cognitive Processing Therapy, Cognitive Behavioral Therapy, Interpersonal Psychotherapy, Introspective Therapy, Person-Centered Therapy, Psychoeducation, Reality Therapy, Strength-Based Therapy, Supportive Therapy        2/20/2024     4:00 PM 1/9/2024     3:51 PM 11/20/2023     8:27 AM 10/17/2022    12:56 PM 9/26/2022     9:56 AM 7/13/2022     7:57 AM 5/30/2022    10:02 AM   GAD7   1. Feeling nervous, anxious, or on edge? 0 0 0 1 0 0 1   2. Not being able to stop or control worrying? 0 0 0 1 0 0 1   3. Worrying too much about different things? 0 0 0 1 0 0 1   4. Trouble relaxing? 0 0 0 1 0 0 1   5. Being so restless that it is hard to sit still? 0 0 0 0 0 0 1   6. Becoming easily annoyed or irritable? 0 0 0 1 0 0 1   7. Feeling afraid as if something awful might happen? 0 0 0 1 0 0 1   VIELKA-7 Score 0 0 0 6 0 0 7      0-4 = Minimal anxiety  5-9 = Mild anxiety  10-14 = Moderate anxiety  15-21 = Severe anxiety         2/20/2024     4:02 PM  1/9/2024     3:52 PM 9/26/2022     9:56 AM 7/13/2022     7:57 AM 5/30/2022    10:03 AM 5/25/2022     4:59 PM 3/29/2022     7:58 AM   PHQ-9 Depression Patient Health Questionnaire   Patient agreed to terms: Yes Yes Yes Yes Yes Yes Yes   Little interest or pleasure in doing things 0 0 0 0 1 0 0   Feeling down, depressed, or hopeless 0 0 0 0 1 0 0   Trouble falling or staying asleep, or sleeping too much 0 3 0 0 1 3 0   Feeling tired or having little energy 0 0 0 0 1 0 0   Poor appetite or overeating 0 0 0 0 1 3 0   Feeling bad about yourself - or that you are a failure or have let yourself or your family down 0 0 0 0 1 0 0   Trouble concentrating on things, such as reading the newspaper or watching television 0 0 0 0 1 0 0   Moving or speaking so slowly that other people could have noticed. Or the opposite - being so fidgety or restless that you have been moving around a lot more than usual 0 0 0 0 1 0 0   Thoughts that you would be better off dead, or of hurting yourself in some way 0 0 0 0 1 0 0   PHQ-9 Total Score 0 3 0 0 9 6 0   If you checked off any problems, how difficult have these problems made it for you to do your work, take care of things at home, or get along with other people? Not difficult at all Not difficult at all Not difficult at all Not difficult at all Somewhat difficult Somewhat difficult Not difficult at all   Interpretation Minimal or None Minimal or None Minimal or None Minimal or None Mild Mild Minimal or None     0-4 = No intervention  5 to 9 = Mild  10 to 14 = Moderate  15 to 19 = Moderately severe  ?20 = Severe      Objective:       Mental Status Evaluation  Appearance: unremarkable, age appropriate  Behavior: normal, cooperative  Speech: normal tone, normal rate, normal pitch, normal volume  Mood: steady  Affect: congruent and appropriate  Thought Process: normal and logical  Thought Content: normal, no suicidality, no homicidality, delusions, or paranoia  Sensorium: grossly  intact  Cognition: grossly intact  Insight: intact  Judgment: adequate to circumstances    Risk parameters:  Patient reports no suicidal ideation  Patient reports no homicidal ideation  Patient reports no self-injurious behavior  Patient reports no violent behavior      Assessment & Plan:     The patient's response to the interventions is accepting    The patient's progress toward treatment goals is fair     Homework assigned: none     Treatment plan:   A. Target symptoms: Anxiety and Mood Disorder   B. Therapeutic modalities: insight oriented psychotherapy, behavior modifying psychotherapy, supportive psychotherapy  C. Why chosen therapy is appropriate versus another modality: relevant to diagnosis, patient responds to this modality, evidence based practice   D. Outcome monitoring methods: self report, observation, rating scales, feedback from clinical staff      Visit Diagnosis:   1. Bipolar I disorder, most recent episode (or current) mixed    2. Generalized anxiety disorder    3. Attention deficit hyperactivity disorder (ADHD), unspecified ADHD type        Follow-up: individual psychotherapy    Return to Clinic: 1 week, 2 weeks  Pt Reported to Schedule Self via Epic EMR MyChart Application and/or Department Support Staff          Shruti Cox LCSW  2/20/2024  4:00 PM

## 2024-02-16 ENCOUNTER — PROCEDURE VISIT (OUTPATIENT)
Dept: OBSTETRICS AND GYNECOLOGY | Facility: CLINIC | Age: 24
End: 2024-02-16
Payer: MEDICAID

## 2024-02-16 ENCOUNTER — TELEPHONE (OUTPATIENT)
Dept: PSYCHIATRY | Facility: CLINIC | Age: 24
End: 2024-02-16
Payer: MEDICAID

## 2024-02-16 VITALS
DIASTOLIC BLOOD PRESSURE: 68 MMHG | SYSTOLIC BLOOD PRESSURE: 108 MMHG | WEIGHT: 161.63 LBS | BODY MASS INDEX: 29.09 KG/M2

## 2024-02-16 DIAGNOSIS — Z30.432 ENCOUNTER FOR IUD REMOVAL: ICD-10-CM

## 2024-02-16 DIAGNOSIS — Z30.430 ENCOUNTER FOR IUD INSERTION: Primary | ICD-10-CM

## 2024-02-16 PROCEDURE — 99999PBSHW PR PBB SHADOW TECHNICAL ONLY FILED TO HB: Mod: PBBFAC,,,

## 2024-02-16 PROCEDURE — 58301 REMOVE INTRAUTERINE DEVICE: CPT | Mod: S$PBB,,, | Performed by: NURSE PRACTITIONER

## 2024-02-16 PROCEDURE — 58300 INSERT INTRAUTERINE DEVICE: CPT | Mod: S$PBB,,, | Performed by: NURSE PRACTITIONER

## 2024-02-16 PROCEDURE — 58300 INSERT INTRAUTERINE DEVICE: CPT | Mod: PBBFAC,PN | Performed by: NURSE PRACTITIONER

## 2024-02-16 RX ADMIN — LEVONORGESTREL 1 INTRA UTERINE DEVICE: 19.5 INTRAUTERINE DEVICE INTRAUTERINE at 10:02

## 2024-02-16 NOTE — PROCEDURES
Removal and Insertion of Intrauterine Device    Date/Time: 2/16/2024 10:30 AM    Performed by: Lydia Vasquez NP  Authorized by: Lydia Vasquez NP    Consent:     Consent obtained:  Prior to procedure the appropriate consent was completed and verified    Consent given by:  Patient    Procedure risks and benefits discussed: yes      Patient questions answered: yes      Patient agrees, verbalizes understanding, and wants to proceed: yes     Device to be inserted was verified by patient: yes    Educational handouts given: yes      Instructions and paperwork completed: yes    Removal Procedure:    IUD grasped by: ring forceps   Removed with no complications: IUD removal not due to complications   Removed due to expiration: Removal due to expiration  Insertion Procedure:   1 Intra Uterine Device Kyleena IUD       Pelvic exam performed: yes      Cervix cleaned and prepped: yes      Speculum placed in vagina: yes      Tenaculum applied to cervix: yes      Uterus sounded: yes      Uterus sound depth (cm):  6    IUD inserted with no complications: yes      Strings trimmed: yes    Post-procedure:     Patient tolerated procedure well: yes    Comments:      Pt rtc in 2 weeks for IUD recheck

## 2024-02-20 ENCOUNTER — OFFICE VISIT (OUTPATIENT)
Dept: PSYCHIATRY | Facility: CLINIC | Age: 24
End: 2024-02-20
Payer: MEDICAID

## 2024-02-20 VITALS
SYSTOLIC BLOOD PRESSURE: 108 MMHG | HEART RATE: 89 BPM | DIASTOLIC BLOOD PRESSURE: 72 MMHG | WEIGHT: 160.5 LBS | BODY MASS INDEX: 28.89 KG/M2

## 2024-02-20 DIAGNOSIS — F31.60 BIPOLAR I DISORDER, MOST RECENT EPISODE (OR CURRENT) MIXED: Primary | ICD-10-CM

## 2024-02-20 DIAGNOSIS — F90.9 ATTENTION DEFICIT HYPERACTIVITY DISORDER (ADHD), UNSPECIFIED ADHD TYPE: ICD-10-CM

## 2024-02-20 DIAGNOSIS — F41.1 GENERALIZED ANXIETY DISORDER: ICD-10-CM

## 2024-02-20 PROCEDURE — 3008F BODY MASS INDEX DOCD: CPT | Mod: CPTII,,, | Performed by: PSYCHOLOGIST

## 2024-02-20 PROCEDURE — 90832 PSYTX W PT 30 MINUTES: CPT | Mod: AJ,HB,95, | Performed by: SOCIAL WORKER

## 2024-02-20 PROCEDURE — 3074F SYST BP LT 130 MM HG: CPT | Mod: CPTII,,, | Performed by: PSYCHOLOGIST

## 2024-02-20 PROCEDURE — 1159F MED LIST DOCD IN RCRD: CPT | Mod: CPTII,,, | Performed by: PSYCHOLOGIST

## 2024-02-20 PROCEDURE — 99215 OFFICE O/P EST HI 40 MIN: CPT | Mod: HP,HB,S$PBB, | Performed by: PSYCHOLOGIST

## 2024-02-20 PROCEDURE — 99999 PR PBB SHADOW E&M-EST. PATIENT-LVL II: CPT | Mod: PBBFAC,HB,, | Performed by: PSYCHOLOGIST

## 2024-02-20 PROCEDURE — 3078F DIAST BP <80 MM HG: CPT | Mod: CPTII,,, | Performed by: PSYCHOLOGIST

## 2024-02-20 PROCEDURE — 99212 OFFICE O/P EST SF 10 MIN: CPT | Mod: PBBFAC | Performed by: PSYCHOLOGIST

## 2024-02-20 RX ORDER — QUETIAPINE FUMARATE 100 MG/1
100-200 TABLET, FILM COATED ORAL NIGHTLY
Qty: 60 TABLET | Refills: 1 | Status: SHIPPED | OUTPATIENT
Start: 2024-02-20 | End: 2024-04-20

## 2024-02-20 RX ORDER — LAMOTRIGINE 100 MG/1
100 TABLET ORAL EVERY MORNING
Qty: 30 TABLET | Refills: 1 | Status: SHIPPED | OUTPATIENT
Start: 2024-02-20 | End: 2024-04-11 | Stop reason: SDUPTHER

## 2024-02-20 RX ORDER — FLUOXETINE HYDROCHLORIDE 40 MG/1
40 CAPSULE ORAL DAILY
Qty: 30 CAPSULE | Refills: 1 | Status: SHIPPED | OUTPATIENT
Start: 2024-02-20 | End: 2024-04-11 | Stop reason: SDUPTHER

## 2024-02-20 RX ORDER — BUSPIRONE HYDROCHLORIDE 10 MG/1
10 TABLET ORAL 2 TIMES DAILY
Qty: 60 TABLET | Refills: 1 | Status: SHIPPED | OUTPATIENT
Start: 2024-02-20 | End: 2024-04-11 | Stop reason: SDUPTHER

## 2024-02-20 NOTE — PROGRESS NOTES
"Outpatient Psychiatry Follow-Up Visit     2/20/2024    Clinical Status of Patient:  Outpatient (Ambulatory)    Chief Complaint:  Mahamed Fernandez is a 23 y.o. female who presents today for follow-up of mood and anxiety.      Impressions/Plan from last visit: Mahaemd and Umer (dad) attended her visit. Since we last met, she messaged that she was manic/hypomanic, and we agreed to have her take Zyprexa with the Caplyta. She messaged a couple of days later, saying that she felt better. She wanted to help dad better understand her bipolar and medicines.    Last week (last Friday), she was more irritable--then she was more anxious and had panic attacks and was tearful. She described "freaking out" and wanting to break up with her boyfriend--other impulsive decisions that she would not normally want. On Monday, she talked to her mom and reached out.  As we talked about her medications, however, Mahamed had misunderstood and doubled her Prozac and Caplyta.  She no longer had the Zyprexa and was not taking it.  She did report feeling more tired, and initially I thought that was because of the Zyprexa.  We agreed to continue her Prozac at the higher dose and will go back to 42 mg of Caplyta.  We will follow-up in 6 weeks, and if she continues with fatigue, we will adjust accordingly.  She was praised for noticing the change in her state and reaching out for assistance before it became a crisis.  We spent some time discussing the nature of her bipolar and anxiety and consistency and compliance with medication.  Mahamed reported that she has already lost some weight, though we do not have that reading from her highest point in our system.  We will continue to monitor this.  Mahamed denied any involuntary movements, and none were observed during this visit. AIMS (modified) completed.    Plan--continue Caplyta 42 mg; increase Prozac 80 mg    Interval History and Content of Current Session: Mahamed and Regina (mom) attended her visit. " "Since we last met, we decreased Prozac to 40 mg and added Lamictal. Anxiety has been "bad" and "overwhelming" on some days. She has been having trouble sleeping--mom said that she has stayed up 3 nights. 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 Regardless, she is in a different position and a little better with the new job. She sees Ms. Brooke for therapy and has an appt soon. She recently learned that her paternal grandmother may have schizophrenia (?)--has been dx with bipolar disorder. Mom said that she was not a reliable source. We reviewed medicine changes--trying to get her to sleep and address anxiety. I suspect that the increase in Prozac contributed to problems. See plan below.    AIMS completed--see below. Denied involuntary movements--none observed.    Plan--continue Caplyta 42 mg; Prozac 40 mg; increase Lamictal 100 mg; add trial of Seroquel 100 mg hs (may take up to 2 at night if needed); add trial of buspirone 10 mg bid (may take 1/2 tab bid for first week)    Umer (dad)    -------------------------------------------------------------------------------------------------------------  Prior medicines: Lamictal, Abilify and Abilify Maintena, Vraylar (great 1.5 mg but dystonic reaction at 3 mg), Zyprexa, Concerta, Intuniv, Vyvanse (angry), Ritalin, clonidine, Wellbutrin, Zoloft, Prozac, Lexapro.     Therapist: Shruti Cox LCSW        2/20/2024     4:00 PM 1/9/2024     3:51 PM 11/20/2023     8:27 AM   GAD7   1. Feeling nervous, anxious, or on edge? 0 0 0   2. Not being able to stop or control worrying? 0 0 0   3. Worrying too much about different things? 0 0 0   4. Trouble relaxing? 0 0 0   5. Being so restless that it is hard to sit still? 0 0 0   6. Becoming easily annoyed or irritable? 0 0 0   7. Feeling afraid as if something awful might happen? 0 0 0   VIELKA-7 Score 0 0 0      0-4 = Minimal anxiety  5-9 = Mild " anxiety  10-14 = Moderate anxiety  15-21 = Severe anxiety       AIMS:   Abnormal Involuntary Movement Scale (highest severity observed) 1   Muscles of Facial Expression  0   Lips and Perioral Area  0   Jaw  0   Tongue  0   Upper (arms, wrists, hands, fingers)  1 (opposite hand fidgets when tapping)   Lower (legs, knees, ankles, toes)  0   Neck, shoulders, hips  0   Severity of abnormal movements (highest score from questions above)  1   Incapacitation due to abnormal movements  0   Patient's awareness of abnormal movements (rate only patient's report)  0   Current problems with teeth and/or dentures?   no   Does patient usually wear dentures?  no     No awareness-0   Aware, no distress-1   Aware, mild distress-2   Aware, moderate distress-3   Aware, severe distress-4      Review of Systems   PSYCHIATRIC: Pertinant items are noted in the narrative.    Past Medical, Family and Social History: The patient's past medical, family and social history have been reviewed and updated as appropriate within the electronic medical record - see encounter notes.      Current Outpatient Medications:     busPIRone (BUSPAR) 10 MG tablet, Take 1 tablet (10 mg total) by mouth 2 (two) times daily., Disp: 60 tablet, Rfl: 1    FLUoxetine 40 MG capsule, Take 1 capsule (40 mg total) by mouth once daily., Disp: 30 capsule, Rfl: 1    lamoTRIgine (LAMICTAL) 100 MG tablet, Take 1 tablet (100 mg total) by mouth every morning., Disp: 30 tablet, Rfl: 1    lumateperone 42 mg Cap, Take 42 mg by mouth once daily., Disp: 30 capsule, Rfl: 1    QUEtiapine (SEROQUEL) 100 MG Tab, Take 1-2 tablets (100-200 mg total) by mouth every evening., Disp: 60 tablet, Rfl: 1    Compliance: yes    Side effects: see above    Risk Parameters:  Patient reports no suicidal ideation  Patient reports no homicidal ideation  Patient reports no self-injurious behavior  Patient reports no violent behavior    Exam (detailed: at least 9 elements; comprehensive: all 15 elements)    Constitutional    Wt Readings from Last 10 Encounters:   02/20/24 72.8 kg (160 lb 7.9 oz)   02/16/24 73.3 kg (161 lb 9.6 oz)   01/10/24 73.5 kg (162 lb 0.6 oz)   08/08/23 70.5 kg (155 lb 6.8 oz)   01/18/23 60.9 kg (134 lb 4.2 oz)   06/15/22 54.9 kg (121 lb 0.5 oz)   03/15/22 55.3 kg (121 lb 14.6 oz)   03/29/21 59.4 kg (130 lb 15.3 oz)   12/23/20 54.6 kg (120 lb 5.9 oz)   11/25/20 51.7 kg (113 lb 15.7 oz)     Vitals:  Most recent vital signs were reviewed.   Last 3 sets of Vitals        1/10/2024     3:06 PM 2/16/2024    10:34 AM 2/20/2024    11:56 AM   Vitals - 1 value per visit   SYSTOLIC 112 108 108   DIASTOLIC 73 68 72   Pulse 78  89   Weight (lb) 162.04 161.6 160.5   Weight (kg) 73.5 73.3 72.8          General:  age appropriate, casually dressed, neatly groomed, has cut/styled hair--blonde; wearing makeup; braces     Musculoskeletal  Muscle Strength/Tone:  no tremor, no tic   Gait & Station:  non-ataxic     Psychiatric  Speech:  no latency; no press   Behavior: wnl   Mood & Affect:  anxious  congruent and appropriate   Thought Process:  normal and logical   Associations:  intact   Thought Content:  Possible paranoia in last job; better now   Insight:  has awareness of illness   Judgement: behavior is adequate to circumstances   Orientation:  grossly intact   Memory: intact for content of interview   Language: grossly intact   Attention Span & Concentration:  Grossly intact   Fund of Knowledge:  intact and appropriate to age and level of education     Assessment and Diagnosis   Status/Progress: Based on the examination today, the patient's problem(s) is/are adequately but not ideally controlled.  New problems have not been presented today.   Co-morbidities and side effects (weight gain) and psychosocial stressors  are complicating management of the primary condition.  There are no active rule-out diagnoses for this patient at this time.     General Impression:     Encounter Diagnoses   Name Primary?    Bipolar  I disorder, most recent episode (or current) mixed Yes    Generalized anxiety disorder        Intervention/Counseling/Treatment Plan   Medication Management: Discussed risks, benefits, and alternatives to treatment plan documented above with patient. I answered all patient questions related to this plan, and patient expressed understanding and agreement.   continue Caplyta 42 mg; Prozac 40 mg; increase Lamictal 100 mg; add trial of Seroquel 100 mg hs (may take up to 2 at night if needed); add trial of buspirone 10 mg bid (may take 1/2 tab bid for first week)  continue therapy        Return to Clinic: 6 weeks     Medication List with Changes/Refills   New Medications    BUSPIRONE (BUSPAR) 10 MG TABLET    Take 1 tablet (10 mg total) by mouth 2 (two) times daily.    QUETIAPINE (SEROQUEL) 100 MG TAB    Take 1-2 tablets (100-200 mg total) by mouth every evening.   Changed and/or Refilled Medications    Modified Medication Previous Medication    FLUOXETINE 40 MG CAPSULE FLUoxetine 40 MG capsule       Take 1 capsule (40 mg total) by mouth once daily.    Take 2 capsules (80 mg total) by mouth once daily.    LAMOTRIGINE (LAMICTAL) 100 MG TABLET lamoTRIgine (LAMICTAL) 25 MG tablet       Take 1 tablet (100 mg total) by mouth every morning.    Take 1 tablet (25 mg total) by mouth every morning for 14 days, THEN 2 tablets (50 mg total) every morning for 14 days.    LUMATEPERONE 42 MG CAP lumateperone 42 mg Cap       Take 42 mg by mouth once daily.    Take 42 mg by mouth once daily.          Time spent with pt including note preparation: 39 minutes     Milli Medina, PhD, MP  Advanced Practice Medical Psychologist  [facility]  [place] [id number]  [phone]   [phone] fax

## 2024-02-20 NOTE — PATIENT INSTRUCTIONS

## 2024-03-01 ENCOUNTER — OFFICE VISIT (OUTPATIENT)
Dept: OBSTETRICS AND GYNECOLOGY | Facility: CLINIC | Age: 24
End: 2024-03-01
Payer: MEDICAID

## 2024-03-01 VITALS
WEIGHT: 163.56 LBS | BODY MASS INDEX: 29.44 KG/M2 | DIASTOLIC BLOOD PRESSURE: 66 MMHG | SYSTOLIC BLOOD PRESSURE: 110 MMHG

## 2024-03-01 DIAGNOSIS — Z30.431 IUD CHECK UP: Primary | ICD-10-CM

## 2024-03-01 PROCEDURE — 99212 OFFICE O/P EST SF 10 MIN: CPT | Mod: PBBFAC,PN | Performed by: NURSE PRACTITIONER

## 2024-03-01 PROCEDURE — 3074F SYST BP LT 130 MM HG: CPT | Mod: CPTII,,, | Performed by: NURSE PRACTITIONER

## 2024-03-01 PROCEDURE — 1160F RVW MEDS BY RX/DR IN RCRD: CPT | Mod: CPTII,,, | Performed by: NURSE PRACTITIONER

## 2024-03-01 PROCEDURE — 99999 PR PBB SHADOW E&M-EST. PATIENT-LVL II: CPT | Mod: PBBFAC,,, | Performed by: NURSE PRACTITIONER

## 2024-03-01 PROCEDURE — 99213 OFFICE O/P EST LOW 20 MIN: CPT | Mod: S$PBB,,, | Performed by: NURSE PRACTITIONER

## 2024-03-01 PROCEDURE — 1159F MED LIST DOCD IN RCRD: CPT | Mod: CPTII,,, | Performed by: NURSE PRACTITIONER

## 2024-03-01 PROCEDURE — 3008F BODY MASS INDEX DOCD: CPT | Mod: CPTII,,, | Performed by: NURSE PRACTITIONER

## 2024-03-01 PROCEDURE — 3078F DIAST BP <80 MM HG: CPT | Mod: CPTII,,, | Performed by: NURSE PRACTITIONER

## 2024-03-01 RX ORDER — LEVONORGESTREL 19.5 MG/1
1 INTRAUTERINE DEVICE INTRAUTERINE ONCE
COMMUNITY

## 2024-03-01 NOTE — PROGRESS NOTES
Mahamed Fernandez is a 23 y.o. female No obstetric history on file. presents for kyleena IUD checkup post placement on 2/16/24 - pt doing well since placement with no issues.       LMP: Patient's last menstrual period was 02/05/2024 (exact date)..       Past Medical History:   Diagnosis Date    ADHD (attention deficit hyperactivity disorder)     Anxiety      Past Surgical History:   Procedure Laterality Date    ABDOMINAL HERNIA REPAIR N/A      Social History     Socioeconomic History    Marital status: Single   Tobacco Use    Smoking status: Never   Substance and Sexual Activity    Alcohol use: No    Drug use: No    Sexual activity: Yes     Partners: Male     Birth control/protection: I.U.D.     Social Determinants of Health     Financial Resource Strain: Low Risk  (2/20/2024)    Overall Financial Resource Strain (CARDIA)     Difficulty of Paying Living Expenses: Not hard at all   Food Insecurity: No Food Insecurity (2/20/2024)    Hunger Vital Sign     Worried About Running Out of Food in the Last Year: Never true     Ran Out of Food in the Last Year: Never true   Transportation Needs: Unmet Transportation Needs (2/20/2024)    PRAPARE - Transportation     Lack of Transportation (Medical): Yes     Lack of Transportation (Non-Medical): Yes   Physical Activity: Insufficiently Active (2/20/2024)    Exercise Vital Sign     Days of Exercise per Week: 2 days     Minutes of Exercise per Session: 60 min   Stress: No Stress Concern Present (2/20/2024)    Citizen of [place] [employer] - Occupational Stress Questionnaire     Feeling of Stress : Only a little   Social Connections: Unknown (2/20/2024)    Social Connection and Isolation Panel [NHANES]     Frequency of Communication with Friends and Family: Never     Frequency of Social Gatherings with Friends and Family: Never     Active Member of Clubs or Organizations: Yes     Attends Club or Organization Meetings: Never     Marital Status:    Housing  Stability: High Risk (2/20/2024)    Housing Stability Vital Sign     Unable to Pay for Housing in the Last Year: Yes     Number of Places Lived in the Last Year: 291     Unstable Housing in the Last Year: No     Family History   Problem Relation Age of Onset    Breast cancer Neg Hx     Colon cancer Neg Hx     Ovarian cancer Neg Hx      OB History    No obstetric history on file.         /66   Wt 74.2 kg (163 lb 9.3 oz)   LMP 02/05/2024 (Exact Date)   BMI 29.44 kg/m²       ROS:  Per hpi    PHYSICAL EXAM:  APPEARANCE: Well nourished, well developed, in no acute distress.  AFFECT: WNL, alert and oriented x 3  PELVIC: Normal external genitalia without lesions.  Normal hair distribution.  Adequate perineal body, normal urethral meatus.  Vagina moist and well rugated without lesions or discharge.  Cervix pink, without lesions, discharge or tenderness - IUD strings noted at os.  No significant cystocele or rectocele.  Bimanual exam shows uterus to be normal size, regular, mobile and nontender.  Adnexa without masses or tenderness.    EXTREMITIES: No edema.  Physical Exam    1. IUD check up         AND PLAN:    Mahamed was seen today for iud check.    Diagnoses and all orders for this visit:    IUD check up     Due for annual in 8/8/24

## 2024-03-04 ENCOUNTER — PATIENT MESSAGE (OUTPATIENT)
Dept: PSYCHIATRY | Facility: CLINIC | Age: 24
End: 2024-03-04
Payer: MEDICAID

## 2024-03-04 DIAGNOSIS — G47.50 PARASOMNIA, UNSPECIFIED TYPE: Primary | ICD-10-CM

## 2024-03-05 ENCOUNTER — PATIENT MESSAGE (OUTPATIENT)
Dept: PULMONOLOGY | Facility: CLINIC | Age: 24
End: 2024-03-05
Payer: MEDICAID

## 2024-03-07 ENCOUNTER — PATIENT MESSAGE (OUTPATIENT)
Dept: PSYCHIATRY | Facility: CLINIC | Age: 24
End: 2024-03-07
Payer: MEDICAID

## 2024-03-07 NOTE — TELEPHONE ENCOUNTER
Pt provided with a general work excuse letter due to breakthrough symptoms. See pt portal correspondence dated 3/7/24 for further information.       Shruti Cox LCSW  03/07/2024   12:23 PM

## 2024-03-07 NOTE — LETTER
March 07, 2024      [facility]  [address] [place] [address] [address] [place] [address]  Phone: [phone]  Fax: [phone]       Patient: Mahamed Fernandez   YOB: 2000    To Whom It May Concern:    Mahamed Fernandez is an established patient under my therapeutic care. Due to breakthrough mental health symptomology, please excuse Mahamed from work/school to allow appropriate psychiatric recovery. The patient may return to work/school on 03/09/2024 with no restrictions. If you have any questions or concerns, or if I can be of further assistance, please do not hesitate to contact me.    Sincerely,    Shruti Cox LCSW

## 2024-03-14 ENCOUNTER — OFFICE VISIT (OUTPATIENT)
Dept: PULMONOLOGY | Facility: CLINIC | Age: 24
End: 2024-03-14
Payer: MEDICAID

## 2024-03-14 VITALS — WEIGHT: 163 LBS | BODY MASS INDEX: 30 KG/M2 | HEIGHT: 62 IN

## 2024-03-14 DIAGNOSIS — G47.50 PARASOMNIA, UNSPECIFIED TYPE: ICD-10-CM

## 2024-03-14 DIAGNOSIS — F31.60 BIPOLAR I DISORDER, MOST RECENT EPISODE (OR CURRENT) MIXED: Primary | ICD-10-CM

## 2024-03-14 PROCEDURE — 1160F RVW MEDS BY RX/DR IN RCRD: CPT | Mod: CPTII,95,, | Performed by: HOSPITALIST

## 2024-03-14 PROCEDURE — 1159F MED LIST DOCD IN RCRD: CPT | Mod: CPTII,95,, | Performed by: HOSPITALIST

## 2024-03-14 PROCEDURE — 99203 OFFICE O/P NEW LOW 30 MIN: CPT | Mod: 95,,, | Performed by: HOSPITALIST

## 2024-03-14 PROCEDURE — 3008F BODY MASS INDEX DOCD: CPT | Mod: CPTII,95,, | Performed by: HOSPITALIST

## 2024-03-14 NOTE — PATIENT INSTRUCTIONS
Below is the link to the 2 week sleep diary- please print this out and begin filling it out. Be sure to bring it to your follow up appointment.     [email or web address]

## 2024-03-14 NOTE — PROGRESS NOTES
Subjective:      Patient ID: Mahamed Fernandez is a 23 y.o. female.    The patient location is: [place]  The chief complaint leading to consultation is: Insomnia, parasomnia    Visit type: audiovisual    Face to Face time with patient: 15 minutes  30 minutes of total time spent on the encounter, which includes face to face time and non-face to face time preparing to see the patient (eg, review of tests), Obtaining and/or reviewing separately obtained history, Documenting clinical information in the electronic or other health record, Independently interpreting results (not separately reported) and communicating results to the patient/family/caregiver, or Care coordination (not separately reported).     Each patient to whom he or she provides medical services by telemedicine is:  (1) informed of the relationship between the physician and patient and the respective role of any other health care provider with respect to management of the patient; and (2) notified that he or she may decline to receive medical services by telemedicine and may withdraw from such care at any time.      Chief Complaint: Parasomnia    23 year old female with history of Bipolar 1 who is referred to Pulmonary clinic by Milli Medina, PhD for evaluation of parasomnia.    Mahamed reports that since she was a child, she has slept in short increments. She has also had episodes recently of being unable to sleep at night for several days in a row, broken by occasional 2-3 hour naps in the afternoon when she crashes. Specifically, she was awake for 7 days straight during the Mardi Gras season (does not believe she was manic, no increased life stress at that time). And was awake 4 days last week (did have two friends pass). When she takes the afternoon naps- recounts that she is overcome with exhaustion and has to sleep.    Night routine: Showers at 7-8pm, in bed for 930pm, turns tv on, has to have noise- 30 minute timer on tv. Often falls asleep before  "timer is up. Will fall asleep for an hour or so and then wake up- feels fully awake, doesn't turn tv back on or look at phone. Wakes up frequently around 2am and then 6am. Takes about an hour to fall back to sleep. Alarm goes off at 745am.     Parasomnia: Has frequent bizarre dreams. Crying in her sleep. +sleep walking and sleep eating. Has never left her house. No hypnogognic hallucinations. Negative for cataplexy. Recently started on 200mg Seroquel at night- makes her tired, but doesn't go to sleep.     Pertinent Eval:  LULU 20    Review of Systems  Objective:     Physical Exam  Personal Diagnostic Review  As Above      3/1/2024    10:26 AM 2/20/2024    11:56 AM 2/16/2024    10:34 AM 1/10/2024     3:06 PM 8/8/2023     8:32 AM 1/18/2023     8:28 AM 6/15/2022     9:06 AM   Pulmonary Function Tests   Height     5' 2.5" (1.588 m)  5' 2.5" (1.588 m)   Weight 74.2 kg (163 lb 9.3 oz) 72.8 kg (160 lb 7.9 oz) 73.3 kg (161 lb 9.6 oz) 73.5 kg (162 lb 0.6 oz) 70.5 kg (155 lb 6.8 oz) 60.9 kg (134 lb 4.2 oz) 54.9 kg (121 lb 0.5 oz)   BMI (Calculated)     28  21.8        Assessment:     No diagnosis found.     Outpatient Encounter Medications as of 3/14/2024   Medication Sig Dispense Refill    busPIRone (BUSPAR) 10 MG tablet Take 1 tablet (10 mg total) by mouth 2 (two) times daily. 60 tablet 1    FLUoxetine 40 MG capsule Take 1 capsule (40 mg total) by mouth once daily. 30 capsule 1    lamoTRIgine (LAMICTAL) 100 MG tablet Take 1 tablet (100 mg total) by mouth every morning. 30 tablet 1    levonorgestreL (KYLEENA) 17.5 mcg/24 hrs (5 yrs) 19.5 mg IUD 1 each by Intrauterine route once.      lumateperone 42 mg Cap Take 42 mg by mouth once daily. 30 capsule 1    QUEtiapine (SEROQUEL) 100 MG Tab Take 1-2 tablets (100-200 mg total) by mouth every evening. 60 tablet 1     No facility-administered encounter medications on file as of 3/14/2024.     No orders of the defined types were placed in this encounter.        Plan:     Problem List " Items Addressed This Visit          Psychiatric    Bipolar I disorder, most recent episode (or current) mixed - Primary     - on Caplypta, Prozac, Lamictal recently increased, Seroquel and Buspar recently added            Other    Parasomnia     - +sleep walking, + sleep eating, + lucid dreams  - episodes insomnia with crashing for 2-3 hours  - LULU 20  - pt provided link to print out and complete 2 week sleep diary  - follow up in 3-4 weeks with Dr. Miguel

## 2024-03-14 NOTE — ASSESSMENT & PLAN NOTE
- +sleep walking, + sleep eating, + lucid dreams  - episodes insomnia with crashing for 2-3 hours  - LULU 20  - pt provided link to print out and complete 2 week sleep diary  - follow up in 3-4 weeks with Dr. Miguel

## 2024-03-28 ENCOUNTER — PATIENT MESSAGE (OUTPATIENT)
Dept: PSYCHIATRY | Facility: CLINIC | Age: 24
End: 2024-03-28
Payer: MEDICAID

## 2024-03-28 ENCOUNTER — TELEPHONE (OUTPATIENT)
Dept: PSYCHIATRY | Facility: CLINIC | Age: 24
End: 2024-03-28
Payer: MEDICAID

## 2024-03-28 NOTE — TELEPHONE ENCOUNTER
Called Mahamed to check on her--got her voicemail and left a message that I was calling to check on her and to see if she wanted to keep her visit with me virtual or wanted in office; will send portal message

## 2024-04-03 ENCOUNTER — TELEPHONE (OUTPATIENT)
Dept: SLEEP MEDICINE | Facility: CLINIC | Age: 24
End: 2024-04-03
Payer: MEDICAID

## 2024-04-09 ENCOUNTER — TELEPHONE (OUTPATIENT)
Dept: PSYCHIATRY | Facility: CLINIC | Age: 24
End: 2024-04-09
Payer: MEDICAID

## 2024-04-09 ENCOUNTER — PATIENT MESSAGE (OUTPATIENT)
Dept: PSYCHIATRY | Facility: CLINIC | Age: 24
End: 2024-04-09
Payer: MEDICAID

## 2024-04-11 ENCOUNTER — TELEPHONE (OUTPATIENT)
Dept: PSYCHIATRY | Facility: CLINIC | Age: 24
End: 2024-04-11
Payer: MEDICAID

## 2024-04-11 ENCOUNTER — OFFICE VISIT (OUTPATIENT)
Dept: PSYCHIATRY | Facility: CLINIC | Age: 24
End: 2024-04-11
Payer: MEDICAID

## 2024-04-11 DIAGNOSIS — Z56.0 LOSS OF JOB: ICD-10-CM

## 2024-04-11 DIAGNOSIS — F41.1 GENERALIZED ANXIETY DISORDER: ICD-10-CM

## 2024-04-11 DIAGNOSIS — F31.60 BIPOLAR I DISORDER, MOST RECENT EPISODE (OR CURRENT) MIXED: Primary | ICD-10-CM

## 2024-04-11 PROCEDURE — 99214 OFFICE O/P EST MOD 30 MIN: CPT | Mod: HP,HB,95, | Performed by: PSYCHOLOGIST

## 2024-04-11 PROCEDURE — 1159F MED LIST DOCD IN RCRD: CPT | Mod: CPTII,95,, | Performed by: PSYCHOLOGIST

## 2024-04-11 RX ORDER — FLUOXETINE HYDROCHLORIDE 40 MG/1
40 CAPSULE ORAL DAILY
Qty: 30 CAPSULE | Refills: 2 | Status: SHIPPED | OUTPATIENT
Start: 2024-04-11 | End: 2024-06-12 | Stop reason: SDUPTHER

## 2024-04-11 RX ORDER — BUSPIRONE HYDROCHLORIDE 10 MG/1
10 TABLET ORAL 2 TIMES DAILY
Qty: 60 TABLET | Refills: 2 | Status: SHIPPED | OUTPATIENT
Start: 2024-04-11 | End: 2024-06-12 | Stop reason: SDUPTHER

## 2024-04-11 RX ORDER — LAMOTRIGINE 100 MG/1
100 TABLET ORAL EVERY MORNING
Qty: 30 TABLET | Refills: 2 | Status: SHIPPED | OUTPATIENT
Start: 2024-04-11 | End: 2024-06-12 | Stop reason: SDUPTHER

## 2024-04-11 SDOH — SOCIAL DETERMINANTS OF HEALTH (SDOH): UNEMPLOYMENT, UNSPECIFIED: Z56.0
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
## 2024-04-11 NOTE — TELEPHONE ENCOUNTER
Called Mahamed to check on her when she did not show for her 9 appt--she said that she had just woken up and had been sleeping since 1 pm yesterday. She was going to try logging in at the time of the call (9:15).

## 2024-04-11 NOTE — PATIENT INSTRUCTIONS

## 2024-04-11 NOTE — LETTER
April 11, 2024        Adele Tsang MD  [address]  [place] [address]  [facility] [address]             [facility]  [address] [facility] [address]  Phone: [phone]  Fax: [phone]   Patient: Mahamed Fernandez   MR Number: [id number]   YOB: 2000   Date of Visit: 4/11/2024     Dear Dr. Tsang,     I saw Mahamed today for follow-up.  Please see attached, and let me know if you have any questions or need more information.  I appreciate following her along with you.    Sincerely,    Milli Medina, PhD, MPAP  Advanced Practice Medical Psychologist            CC  Shruti Cox, ELEAZAR    Enclosure

## 2024-06-07 NOTE — TELEPHONE ENCOUNTER
----- Message from Gume Salazar sent at 6/7/2024 10:25 AM CDT -----  Contact: [phone]  Patient called in requesting a call back about switching all of her medications to another pharmacy where she moved to, please call [phone]         [address] [address]

## 2024-06-10 ENCOUNTER — PATIENT MESSAGE (OUTPATIENT)
Dept: PSYCHIATRY | Facility: CLINIC | Age: 24
End: 2024-06-10
Payer: MEDICAID

## 2024-06-12 ENCOUNTER — OFFICE VISIT (OUTPATIENT)
Dept: PSYCHIATRY | Facility: CLINIC | Age: 24
End: 2024-06-12
Payer: MEDICAID

## 2024-06-12 DIAGNOSIS — F41.1 GENERALIZED ANXIETY DISORDER: ICD-10-CM

## 2024-06-12 DIAGNOSIS — F31.60 BIPOLAR I DISORDER, MOST RECENT EPISODE (OR CURRENT) MIXED: Primary | ICD-10-CM

## 2024-06-12 PROCEDURE — 1159F MED LIST DOCD IN RCRD: CPT | Mod: CPTII,95,, | Performed by: PSYCHOLOGIST

## 2024-06-12 PROCEDURE — 99214 OFFICE O/P EST MOD 30 MIN: CPT | Mod: HP,HB,95, | Performed by: PSYCHOLOGIST

## 2024-06-12 RX ORDER — LAMOTRIGINE 100 MG/1
100 TABLET ORAL EVERY MORNING
Qty: 30 TABLET | Refills: 2 | Status: SHIPPED | OUTPATIENT
Start: 2024-06-12 | End: 2024-09-10

## 2024-06-12 RX ORDER — BUSPIRONE HYDROCHLORIDE 10 MG/1
10 TABLET ORAL 2 TIMES DAILY
Qty: 60 TABLET | Refills: 2 | Status: SHIPPED | OUTPATIENT
Start: 2024-06-12 | End: 2024-09-10

## 2024-06-12 RX ORDER — FLUOXETINE HYDROCHLORIDE 40 MG/1
40 CAPSULE ORAL DAILY
Qty: 30 CAPSULE | Refills: 2 | Status: SHIPPED | OUTPATIENT
Start: 2024-06-12 | End: 2024-09-10

## 2024-06-12 RX ORDER — CETIRIZINE HYDROCHLORIDE 10 MG/1
TABLET ORAL
COMMUNITY
Start: 2024-04-01

## 2024-06-12 NOTE — PATIENT INSTRUCTIONS
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
## 2024-07-14 ENCOUNTER — E-VISIT (OUTPATIENT)
Dept: OBSTETRICS AND GYNECOLOGY | Facility: CLINIC | Age: 24
End: 2024-07-14
Payer: MEDICAID

## 2024-07-14 DIAGNOSIS — L30.9 DERMATITIS: Primary | ICD-10-CM

## 2024-07-14 PROCEDURE — 99421 OL DIG E/M SVC 5-10 MIN: CPT | Mod: ,,, | Performed by: NURSE PRACTITIONER

## 2024-07-15 RX ORDER — TRIAMCINOLONE ACETONIDE 0.25 MG/G
OINTMENT TOPICAL 2 TIMES DAILY
Qty: 15 G | Refills: 1 | Status: SHIPPED | OUTPATIENT
Start: 2024-07-15 | End: 2024-07-25

## 2024-07-15 NOTE — PROGRESS NOTES
Patient ID: Mahamed Fernandez is a 23 y.o. female.    Chief Complaint: Rash (Entered automatically based on patient selection in [facility].)    The patient initiated a request through [facility] [facility] on 7/14/2024 for evaluation and management with a chief complaint of Rash (Entered automatically based on patient selection in [facility] [facility].)     I evaluated the questionnaire submission on 7/15/24.    [facility] Peq Evisit Rash    7/14/2024 10:17 PM CDT - Filed by Patient   Do you agree to participate in an E-Visit? Yes   If you have any of the following symptoms, please present to your local emergency room or call 911:  I acknowledge   Are you pregnant, could you be pregnant, or are you breast feeding? None of the above   What is the main issue you would like addressed today? Rash   How would you describe your skin problem? Rash   When did your symptoms first appear? 7/14/2024   Where is it located?  Buttock/anus   Does it itch? No   Does it hurt? Yes   Where is the pain located? Where the skin change is noted   The pain came on: Suddenly   The pain has the character of: Aching   Frequency of the pain (How often does it appear)? This is the first time I have had this rash   Please select the face that most closely captures your pain level: 2   Is there discharge or drainage? No   Is there bleeding? No   Describe the character Spots   Describe the color Red   Has it changed over time? No change   Frequency of skin problem Fluctuates at random   Duration of the skin problem (how long does it stay when it is present) Minutes   I have had a new exposure to No new exposures   I have had a new exposure to No new exposures   What have you used to treat the skin problem? Lotion   If you have used anything for treatment, has it helped the symptoms? No   Other generalized symptoms that you associate with the rash No other symptoms   Provide any additional information you feel is important. I used duque a few days ago   At least one photo is required  for treatment to be provided. You can upload a maximum of three photos of the affected area.     Are you able to take your vital signs? No         Encounter Diagnosis   Name Primary?    Dermatitis Yes        No orders of the defined types were placed in this encounter.     Medications Ordered This Encounter   Medications    triamcinolone acetonide 0.025% (KENALOG) 0.025 % Oint     Sig: Apply topically 2 (two) times daily. for 10 days     Dispense:  15 g     Refill:  1        No follow-ups on file.      E-Visit Time Tracking:

## 2024-07-25 ENCOUNTER — TELEPHONE (OUTPATIENT)
Dept: PSYCHIATRY | Facility: CLINIC | Age: 24
End: 2024-07-25
Payer: MEDICAID

## 2024-07-29 ENCOUNTER — PATIENT MESSAGE (OUTPATIENT)
Dept: PSYCHIATRY | Facility: CLINIC | Age: 24
End: 2024-07-29

## 2024-09-07 DIAGNOSIS — F31.60 BIPOLAR I DISORDER, MOST RECENT EPISODE (OR CURRENT) MIXED: ICD-10-CM

## 2024-10-02 ENCOUNTER — PATIENT MESSAGE (OUTPATIENT)
Dept: PSYCHIATRY | Facility: CLINIC | Age: 24
End: 2024-10-02
Payer: MEDICAID

## 2024-10-04 ENCOUNTER — OFFICE VISIT (OUTPATIENT)
Dept: PSYCHIATRY | Facility: CLINIC | Age: 24
End: 2024-10-04
Payer: MEDICAID

## 2024-10-04 DIAGNOSIS — F31.60 BIPOLAR I DISORDER, MOST RECENT EPISODE (OR CURRENT) MIXED: Primary | ICD-10-CM

## 2024-10-04 DIAGNOSIS — F41.1 GENERALIZED ANXIETY DISORDER: ICD-10-CM

## 2024-10-04 DIAGNOSIS — Z65.8 RELATIONAL PROBLEM: ICD-10-CM

## 2024-10-04 RX ORDER — LAMOTRIGINE 150 MG/1
150 TABLET ORAL EVERY MORNING
Qty: 30 TABLET | Refills: 2 | Status: SHIPPED | OUTPATIENT
Start: 2024-10-04 | End: 2025-01-02

## 2024-10-04 RX ORDER — BUSPIRONE HYDROCHLORIDE 15 MG/1
15 TABLET ORAL 2 TIMES DAILY
Qty: 60 TABLET | Refills: 2 | Status: SHIPPED | OUTPATIENT
Start: 2024-10-04 | End: 2025-01-02

## 2024-10-04 RX ORDER — QUETIAPINE FUMARATE 100 MG/1
100 TABLET, FILM COATED ORAL NIGHTLY
Qty: 30 TABLET | Refills: 2 | Status: SHIPPED | OUTPATIENT
Start: 2024-10-04 | End: 2025-01-02

## 2024-10-04 RX ORDER — FLUOXETINE HYDROCHLORIDE 40 MG/1
40 CAPSULE ORAL DAILY
Qty: 30 CAPSULE | Refills: 2 | Status: SHIPPED | OUTPATIENT
Start: 2024-10-04 | End: 2025-01-02
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
## 2024-10-04 NOTE — PATIENT INSTRUCTIONS

## 2024-10-08 ENCOUNTER — TELEPHONE (OUTPATIENT)
Dept: PSYCHIATRY | Facility: CLINIC | Age: 24
End: 2024-10-08
Payer: MEDICAID

## 2024-10-08 NOTE — TELEPHONE ENCOUNTER
----- Message from Milli Medina sent at 10/4/2024  9:12 AM CDT -----  Regarding: schedule  I had requested a 1 pm for Mahamed on 12/12 because of her work schedule. I would like 60 mins with her--the patient who is currently scheduled at 1:30 might be able to come at 2 instead of 1:30--would you mind checking with him to see if that would be okay? Or call Mahamed to see if she could make her visit at 2 instead of 1 so we can have an hour?    thanks

## 2024-11-14 ENCOUNTER — LAB VISIT (OUTPATIENT)
Dept: LAB | Facility: HOSPITAL | Age: 24
End: 2024-11-14
Attending: NURSE PRACTITIONER
Payer: MEDICAID

## 2024-11-14 ENCOUNTER — OFFICE VISIT (OUTPATIENT)
Dept: OBSTETRICS AND GYNECOLOGY | Facility: CLINIC | Age: 24
End: 2024-11-14
Payer: MEDICAID

## 2024-11-14 VITALS — BODY MASS INDEX: 28.87 KG/M2 | DIASTOLIC BLOOD PRESSURE: 60 MMHG | SYSTOLIC BLOOD PRESSURE: 98 MMHG | WEIGHT: 157.88 LBS

## 2024-11-14 DIAGNOSIS — Z11.3 SCREENING FOR STD (SEXUALLY TRANSMITTED DISEASE): ICD-10-CM

## 2024-11-14 DIAGNOSIS — Z30.431 IUD CHECK UP: ICD-10-CM

## 2024-11-14 DIAGNOSIS — Z01.419 ENCOUNTER FOR GYNECOLOGICAL EXAMINATION WITHOUT ABNORMAL FINDING: Primary | ICD-10-CM

## 2024-11-14 LAB
HAV IGM SERPL QL IA: NORMAL
HBV CORE IGM SERPL QL IA: NORMAL
HBV SURFACE AG SERPL QL IA: NORMAL
HCV AB SERPL QL IA: NORMAL
HIV 1+2 AB+HIV1 P24 AG SERPL QL IA: NORMAL
TREPONEMA PALLIDUM IGG+IGM AB [PRESENCE] IN SERUM OR PLASMA BY IMMUNOASSAY: NONREACTIVE

## 2024-11-14 PROCEDURE — 87591 N.GONORRHOEAE DNA AMP PROB: CPT | Performed by: NURSE PRACTITIONER

## 2024-11-14 PROCEDURE — 80074 ACUTE HEPATITIS PANEL: CPT | Performed by: NURSE PRACTITIONER

## 2024-11-14 PROCEDURE — 86593 SYPHILIS TEST NON-TREP QUANT: CPT | Performed by: NURSE PRACTITIONER

## 2024-11-14 PROCEDURE — 87389 HIV-1 AG W/HIV-1&-2 AB AG IA: CPT | Performed by: NURSE PRACTITIONER

## 2024-11-14 PROCEDURE — 99213 OFFICE O/P EST LOW 20 MIN: CPT | Mod: PBBFAC,PN | Performed by: NURSE PRACTITIONER

## 2024-11-14 PROCEDURE — 99999 PR PBB SHADOW E&M-EST. PATIENT-LVL III: CPT | Mod: PBBFAC,,, | Performed by: NURSE PRACTITIONER

## 2024-11-14 PROCEDURE — 36415 COLL VENOUS BLD VENIPUNCTURE: CPT | Mod: PN | Performed by: NURSE PRACTITIONER

## 2024-11-14 NOTE — PROGRESS NOTES
CC: Well woman exam    Mahamed Fernandez is a 24 y.o. female No obstetric history on file. presents for well woman exam.  LMP: Patient's last menstrual period was 10/16/2024..    Doing well with kyleena - inserted on 2/16/24  Will do full STD testing     Health Maintenance Topics with due status: Not Due       Topic Last Completion Date    Pap Smear 08/08/2023    Chlamydia Screening 06/24/2024    TETANUS VACCINE 07/13/2024    RSV Vaccine (Age 60+ and Pregnant patients) Not Due     Past Medical History:   Diagnosis Date    ADHD (attention deficit hyperactivity disorder)     Anxiety      Past Surgical History:   Procedure Laterality Date    ABDOMINAL HERNIA REPAIR N/A      Social History     Socioeconomic History    Marital status: Single   Tobacco Use    Smoking status: Never   Substance and Sexual Activity    Alcohol use: No    Drug use: No    Sexual activity: Yes     Partners: Male     Birth control/protection: I.U.D.     Social Drivers of Health     Financial Resource Strain: Low Risk  (2/20/2024)    Overall Financial Resource Strain (CARDIA)     Difficulty of Paying Living Expenses: Not hard at all   Food Insecurity: No Food Insecurity (2/20/2024)    Hunger Vital Sign     Worried About Running Out of Food in the Last Year: Never true     Ran Out of Food in the Last Year: Never true   Transportation Needs: Unmet Transportation Needs (2/20/2024)    PRAPARE - Transportation     Lack of Transportation (Medical): Yes     Lack of Transportation (Non-Medical): Yes   Physical Activity: Insufficiently Active (2/20/2024)    Exercise Vital Sign     Days of Exercise per Week: 2 days     Minutes of Exercise per Session: 60 min   Stress: No Stress Concern Present (2/20/2024)    [employer] - Occupational Stress Questionnaire     Feeling of Stress : Only a little   Housing Stability: High Risk (2/20/2024)    Housing Stability Vital Sign     Unable to Pay for Housing in the Last Year: Yes     Number  of Places Lived in the Last Year: 291     Unstable Housing in the Last Year: No     Family History   Problem Relation Name Age of Onset    Breast cancer Neg Hx      Colon cancer Neg Hx      Ovarian cancer Neg Hx       OB History    No obstetric history on file.         BP 98/60   Wt 71.6 kg (157 lb 13.6 oz)   LMP 10/16/2024   BMI 28.87 kg/m²       ROS:  Per hpi    PHYSICAL EXAM:  APPEARANCE: Well nourished, well developed, in no acute distress.  AFFECT: WNL, alert and oriented x 3  SKIN: No acne or hirsutism  NECK: Neck symmetric without masses or thyromegaly  NODES: No inguinal, cervical, axillary, or femoral lymph node enlargement  CHEST: Good respiratory effect  ABDOMEN: Soft.  No tenderness or masses.  No hepatosplenomegaly.  No hernias.  BREASTS: Symmetrical, no skin changes or visible lesions.  No palpable masses, nipple discharge bilaterally.  PELVIC: Normal external genitalia without lesions.  Normal hair distribution.  Adequate perineal body, normal urethral meatus.  Vagina moist and well rugated without lesions or discharge.  Cervix pink, without lesions, discharge or tenderness - IUD strings noted.  No significant cystocele or rectocele.  Bimanual exam shows uterus to be normal size, regular, mobile and nontender.  Adnexa without masses or tenderness.    EXTREMITIES: No edema.  Physical Exam    1. Encounter for gynecological examination without abnormal finding        2. IUD check up        3. Screening for STD (sexually transmitted disease)  C. trachomatis/N. gonorrhoeae by AMP DNA [facility]; Cervix    Treponema Pallidium Antibodies IgG, IgM    Hepatitis Panel, Acute    HIV 1/2 Ag/Ab (4th Gen)       AND PLAN:    Mahamed was seen today for gynecologic exam.    Diagnoses and all orders for this visit:    Encounter for gynecological examination without abnormal finding    IUD check up    Screening for STD (sexually transmitted disease)  -     C. trachomatis/N. gonorrhoeae by AMP [facility]; Cervix  -      Treponema Pallidium Antibodies IgG, IgM; Future  -     Hepatitis Panel, Acute; Future  -     HIV 1/2 Ag/Ab (4th Gen); Future         Patient was counseled today on A.C.S. Pap guidelines and recommendations for yearly pelvic exams, mammograms and monthly self breast exams; to see her PCP for other health maintenance.

## 2024-11-16 LAB
C TRACH DNA SPEC QL NAA+PROBE: NOT DETECTED
N GONORRHOEA DNA SPEC QL NAA+PROBE: NOT DETECTED

## 2024-11-30 ENCOUNTER — PATIENT MESSAGE (OUTPATIENT)
Dept: PSYCHIATRY | Facility: CLINIC | Age: 24
End: 2024-11-30
Payer: MEDICAID

## 2024-12-23 ENCOUNTER — TELEPHONE (OUTPATIENT)
Dept: PSYCHIATRY | Facility: CLINIC | Age: 24
End: 2024-12-23
Payer: MEDICAID

## 2024-12-23 DIAGNOSIS — F31.60 BIPOLAR I DISORDER, MOST RECENT EPISODE (OR CURRENT) MIXED: ICD-10-CM

## 2024-12-23 NOTE — TELEPHONE ENCOUNTER
Called Mahamed to discuss her medicines--the last script was sent to [facility] in [facility]; she is now living back with her mom and wanted it to go to [place], but apparently many pharmacies were out of stock; will send to [facility]--she will call them to follow-up on picking it up

## 2024-12-23 NOTE — TELEPHONE ENCOUNTER
Left pt a message to return call regarding her medication      ----- Message from Vianca sent at 12/23/2024 10:22 AM CST -----  Type:  Patient Return Call Requested    Who Called:Mahamed  Who Left Message for Patient:Wilma Velazquez,  Does the patient know what this is regarding?:Medication  Would the patient rather a call back or a response via [facility]? Callback  Best Call Back Number:[phone]  Additional Information: patient stating that her medication can  not get filled no pharmacy in [place] have it and need to know if she can get something else to take. Please callback. Patient state she can not work or concentrate

## 2025-01-15 ENCOUNTER — PATIENT MESSAGE (OUTPATIENT)
Dept: PSYCHIATRY | Facility: CLINIC | Age: 25
End: 2025-01-15
Payer: MEDICAID

## 2025-01-19 DIAGNOSIS — F31.60 BIPOLAR I DISORDER, MOST RECENT EPISODE (OR CURRENT) MIXED: ICD-10-CM

## 2025-01-22 ENCOUNTER — TELEPHONE (OUTPATIENT)
Dept: PSYCHIATRY | Facility: CLINIC | Age: 25
End: 2025-01-22
Payer: MEDICAID

## 2025-01-22 ENCOUNTER — PATIENT MESSAGE (OUTPATIENT)
Dept: PSYCHIATRY | Facility: CLINIC | Age: 25
End: 2025-01-22
Payer: MEDICAID

## 2025-01-22 DIAGNOSIS — F31.60 BIPOLAR I DISORDER, MOST RECENT EPISODE (OR CURRENT) MIXED: ICD-10-CM

## 2025-01-22 RX ORDER — LAMOTRIGINE 25 MG/1
TABLET ORAL
Qty: 42 TABLET | Refills: 0 | Status: SHIPPED | OUTPATIENT
Start: 2025-01-22 | End: 2025-01-23

## 2025-01-22 RX ORDER — LAMOTRIGINE 150 MG/1
150 TABLET ORAL EVERY MORNING
Qty: 30 TABLET | Refills: 2 | Status: SHIPPED | OUTPATIENT
Start: 2025-03-05 | End: 2025-01-23

## 2025-01-22 RX ORDER — LAMOTRIGINE 100 MG/1
100 TABLET ORAL EVERY MORNING
Qty: 14 TABLET | Refills: 0 | Status: SHIPPED | OUTPATIENT
Start: 2025-02-19 | End: 2025-01-23

## 2025-01-22 NOTE — TELEPHONE ENCOUNTER
Called Mahamed to discuss Lamictal and how long she has been out of it--got her voicemail and left a message that if she has been out of it for more than 4 days in a row, we will need to start back at 25 mg and titrate up

## 2025-01-23 ENCOUNTER — OFFICE VISIT (OUTPATIENT)
Dept: PSYCHIATRY | Facility: CLINIC | Age: 25
End: 2025-01-23
Payer: MEDICAID

## 2025-01-23 DIAGNOSIS — F41.1 GENERALIZED ANXIETY DISORDER: ICD-10-CM

## 2025-01-23 DIAGNOSIS — F90.2 ATTENTION DEFICIT HYPERACTIVITY DISORDER (ADHD), COMBINED TYPE: ICD-10-CM

## 2025-01-23 DIAGNOSIS — F31.60 BIPOLAR I DISORDER, MOST RECENT EPISODE (OR CURRENT) MIXED: Primary | ICD-10-CM

## 2025-01-23 RX ORDER — FLUTICASONE PROPIONATE 50 MCG
SPRAY, SUSPENSION (ML) NASAL
COMMUNITY
Start: 2024-12-09

## 2025-01-23 RX ORDER — FLUOXETINE HYDROCHLORIDE 40 MG/1
40 CAPSULE ORAL DAILY
Qty: 30 CAPSULE | Refills: 2 | Status: SHIPPED | OUTPATIENT
Start: 2025-01-23 | End: 2025-04-23

## 2025-01-23 RX ORDER — LAMOTRIGINE 150 MG/1
150 TABLET ORAL EVERY MORNING
Qty: 30 TABLET | Refills: 2 | Status: SHIPPED | OUTPATIENT
Start: 2025-01-23 | End: 2025-04-23

## 2025-01-23 RX ORDER — LISDEXAMFETAMINE DIMESYLATE 30 MG/1
30 CAPSULE ORAL EVERY MORNING
Qty: 30 CAPSULE | Refills: 0 | Status: SHIPPED | OUTPATIENT
Start: 2025-01-23

## 2025-01-23 RX ORDER — BUSPIRONE HYDROCHLORIDE 15 MG/1
15 TABLET ORAL 2 TIMES DAILY
Qty: 60 TABLET | Refills: 2 | Status: SHIPPED | OUTPATIENT
Start: 2025-01-23 | End: 2025-04-23

## 2025-01-23 NOTE — PATIENT INSTRUCTIONS
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
## 2025-02-20 ENCOUNTER — PATIENT MESSAGE (OUTPATIENT)
Dept: PSYCHIATRY | Facility: CLINIC | Age: 25
End: 2025-02-20
Payer: MEDICAID

## 2025-02-21 DIAGNOSIS — F90.2 ATTENTION DEFICIT HYPERACTIVITY DISORDER (ADHD), COMBINED TYPE: ICD-10-CM

## 2025-02-24 RX ORDER — LISDEXAMFETAMINE DIMESYLATE 30 MG/1
30 CAPSULE ORAL EVERY MORNING
Qty: 30 CAPSULE | Refills: 0 | Status: SHIPPED | OUTPATIENT
Start: 2025-02-24

## 2025-03-28 DIAGNOSIS — F90.2 ATTENTION DEFICIT HYPERACTIVITY DISORDER (ADHD), COMBINED TYPE: ICD-10-CM

## 2025-03-31 RX ORDER — LISDEXAMFETAMINE DIMESYLATE 30 MG/1
30 CAPSULE ORAL EVERY MORNING
Qty: 30 CAPSULE | Refills: 0 | Status: SHIPPED | OUTPATIENT
Start: 2025-03-31

## 2025-04-23 ENCOUNTER — OFFICE VISIT (OUTPATIENT)
Dept: PSYCHIATRY | Facility: CLINIC | Age: 25
End: 2025-04-23
Payer: MEDICAID

## 2025-04-23 VITALS
WEIGHT: 137.56 LBS | HEART RATE: 120 BPM | SYSTOLIC BLOOD PRESSURE: 119 MMHG | DIASTOLIC BLOOD PRESSURE: 74 MMHG | BODY MASS INDEX: 25.16 KG/M2

## 2025-04-23 DIAGNOSIS — F41.1 GENERALIZED ANXIETY DISORDER: ICD-10-CM

## 2025-04-23 DIAGNOSIS — F90.2 ATTENTION DEFICIT HYPERACTIVITY DISORDER (ADHD), COMBINED TYPE: ICD-10-CM

## 2025-04-23 DIAGNOSIS — F31.60 BIPOLAR I DISORDER, MOST RECENT EPISODE (OR CURRENT) MIXED: Primary | ICD-10-CM

## 2025-04-23 PROCEDURE — 1159F MED LIST DOCD IN RCRD: CPT | Mod: CPTII,,, | Performed by: PSYCHOLOGIST

## 2025-04-23 PROCEDURE — 99999 PR PBB SHADOW E&M-EST. PATIENT-LVL II: CPT | Mod: PBBFAC,HB,, | Performed by: PSYCHOLOGIST

## 2025-04-23 PROCEDURE — 99212 OFFICE O/P EST SF 10 MIN: CPT | Mod: PBBFAC | Performed by: PSYCHOLOGIST

## 2025-04-23 PROCEDURE — 3078F DIAST BP <80 MM HG: CPT | Mod: CPTII,,, | Performed by: PSYCHOLOGIST

## 2025-04-23 PROCEDURE — 99214 OFFICE O/P EST MOD 30 MIN: CPT | Mod: HP,HB,S$PBB, | Performed by: PSYCHOLOGIST

## 2025-04-23 PROCEDURE — 90833 PSYTX W PT W E/M 30 MIN: CPT | Mod: HP,HB,S$PBB, | Performed by: PSYCHOLOGIST

## 2025-04-23 PROCEDURE — 3074F SYST BP LT 130 MM HG: CPT | Mod: CPTII,,, | Performed by: PSYCHOLOGIST

## 2025-04-23 PROCEDURE — G2211 COMPLEX E/M VISIT ADD ON: HCPCS | Mod: HP,HB,S$PBB, | Performed by: PSYCHOLOGIST

## 2025-04-23 PROCEDURE — 3008F BODY MASS INDEX DOCD: CPT | Mod: CPTII,,, | Performed by: PSYCHOLOGIST

## 2025-04-23 RX ORDER — LISDEXAMFETAMINE DIMESYLATE 30 MG/1
30 CAPSULE ORAL EVERY MORNING
Qty: 30 CAPSULE | Refills: 0 | Status: SHIPPED | OUTPATIENT
Start: 2025-05-23 | End: 2025-06-22

## 2025-04-23 RX ORDER — LAMOTRIGINE 150 MG/1
150 TABLET ORAL EVERY MORNING
Qty: 30 TABLET | Refills: 5 | Status: SHIPPED | OUTPATIENT
Start: 2025-04-23 | End: 2025-10-20

## 2025-04-23 RX ORDER — FLUOXETINE HYDROCHLORIDE 40 MG/1
40 CAPSULE ORAL DAILY
Qty: 30 CAPSULE | Refills: 5 | Status: SHIPPED | OUTPATIENT
Start: 2025-04-23 | End: 2025-10-20

## 2025-04-23 RX ORDER — LISDEXAMFETAMINE DIMESYLATE 30 MG/1
30 CAPSULE ORAL EVERY MORNING
Qty: 30 CAPSULE | Refills: 0 | Status: SHIPPED | OUTPATIENT
Start: 2025-06-22 | End: 2025-07-22

## 2025-04-23 RX ORDER — LISDEXAMFETAMINE DIMESYLATE 30 MG/1
30 CAPSULE ORAL EVERY MORNING
Qty: 30 CAPSULE | Refills: 0 | Status: SHIPPED | OUTPATIENT
Start: 2025-04-23 | End: 2025-05-23

## 2025-04-23 RX ORDER — BUSPIRONE HYDROCHLORIDE 15 MG/1
15 TABLET ORAL 2 TIMES DAILY
Qty: 60 TABLET | Refills: 2 | Status: SHIPPED | OUTPATIENT
Start: 2025-04-23 | End: 2025-07-22

## 2025-04-23 NOTE — PATIENT INSTRUCTIONS

## 2025-04-23 NOTE — PROGRESS NOTES
"Outpatient Psychiatry Follow-Up Visit     4/23/2025      Clinical Status of Patient:  Outpatient (Ambulatory)    Preferred Name: Mahamed  Gender Identity: cis female  Preferred Pronouns: she/her      History of Present Illness    CHIEF COMPLAINT:  Mahamed presents for a follow-up visit regarding bipolar disorder, ADHD, and anxiety, last seen in January.    HPI:  Mahamed reports experiencing significant life events, including the deaths of her grandmother and grandfather within a month of each other. She also mentions a sudden drop in her credit score due to unpaid student loans and her cat becoming seriously ill, requiring hospitalization. Despite these challenging circumstances, she is "doing good" and "managing it very well." She notes significant personal growth in her ability to cope with stressful situations, as observed by her father.    Mahamed describes an improved relationship with her father, referring to him as her "best friend now." They had an in-depth conversation about mental health, leading to her father seeking therapy for his own mental health concerns. Mahamed has moved back to [place] and is now working at a [employer], cutting men's hair. She works 20 hours a week, spread across Monday, Tuesday, Friday, and Saturday, earning approximately $1,000 per week.    Mahamed mentions she has been in a casual dating relationship for about 8-9 months with someone she's known since . She also reports engaging in leisure activities such as playing PlayStation with friends. Mahamed is currently taking Caplyta 42mg, Prozac 40mg, Lamotrigine 150mg, Buspirone 15mg twice daily, and Vyvanse. She reports positive effects from Vyvanse, noting improved focus and reduced procrastination at work.    Mahamed denies experiencing any significant mood swings or episodes related to her bipolar disorder. She mentions having an emotional moment related to her grandmother's passing but recognizes this as normal grief rather " than a bipolar episode. Mahamed reports positive lifestyle changes, including going to the gym, walking regularly, and meal prepping. She has been focusing on eating healthier meals like chicken and rice, and has noticed some weight loss.    MEDICATIONS:  Mahamed is on Caplyta 42 mg daily, Prozac 40 mg daily, Lamotrigine 150 mg daily, Buspirone 15 mg twice daily oral, and Vyvanse 30 mg daily.    FAMILY HISTORY:  Family history is significant for grandmother and grandfather who recently passed away. Her father has a history of mental health struggles and is currently seeing a therapist.    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 She works 20 hours a week, earning $1000 weekly. Her schedule includes working Monday, Tuesday, Friday, and Saturday (8 am to 2 pm on Saturdays), with Wednesdays, Thursdays, and Sundays off. Mahamed has been in a non-committed dating relationship for 8-9 months with someone she has known since . They do not live together. She recently moved back to [place] and lives in a trailer with two cats named Brandon and Ganesh. In her free time, the patient plays PlayStation, goes to the gym, walks up and down the street, meal preps, and watches movies. She socializes through PlayStation with friends and goes out to movies and dinner with the person she's dating. Previously, she had a period of frequent partying, including trips to [facility].    ROS:  Neurological: +lack of focus/concentration, +increased distractibility  Psychiatric: -anxiety, +excessive crying       PSYCHIATRIC: Pertinant items are noted in the narrative.    Past Medical, Family and Social History: The patient's past medical, family and social history have been reviewed and updated as appropriate within the electronic medical record - see encounter notes.     since January 2025.            1/23/2025     7:30 AM 10/4/2024     8:28 AM 6/12/2024     8:59 AM   GAD7   1. Feeling nervous, anxious,  "or on edge? 0 0 0   2. Not being able to stop or control worrying? 0 0 0   3. Worrying too much about different things? 0 0 0   4. Trouble relaxing? 0 0 0   5. Being so restless that it is hard to sit still? 0 0 0   6. Becoming easily annoyed or irritable? 0 0 0   7. Feeling afraid as if something awful might happen? 0 0 0   8. If you checked off any problems, how difficult have these problems made it for you to do your work, take care of things at home, or get along with other people? 0 0    VIELKA-7 Score 0  0  0       Patient-reported      0-4 = Minimal anxiety  5-9 = Mild anxiety  10-14 = Moderate anxiety  15-21 = Severe anxiety       Risk Parameters:  Patient reports no suicidal ideation  Patient reports no homicidal ideation  Patient reports no self-injurious behavior  Patient reports no violent behavior    Exam (detailed: at least 9 elements; comprehensive: all 15 elements)   Constitutional  Vitals:  Most recent vital signs were reviewed.   Last 3 sets of Vitals        3/14/2024    10:40 AM 11/14/2024     1:51 PM 4/23/2025    12:02 PM   Vitals - 1 value per visit   SYSTOLIC  98 119   DIASTOLIC  60 74   Pulse   120   Weight (lb) 163 157.85 137.57   Weight (kg) 73.936 71.6 62.4   Height 5' 2" (1.575 m)     BMI (Calculated) 29.8            General:  age appropriate, casually dressed, neatly groomed     Musculoskeletal  Muscle Strength/Tone:  no tremor, no tic   Gait & Station:  non-ataxic     Psychiatric  Speech:  no latency; no press   Behavior: wnl   Mood & Affect:  euthymic  congruent and appropriate, bright affect   Thought Process:  normal and logical   Associations:  intact   Thought Content:  normal, no suicidality, no homicidality, delusions, or paranoia   Insight:  has awareness of illness   Judgement: behavior is adequate to circumstances   Orientation:  grossly intact   Memory: intact for content of interview   Language: grossly intact   Attention Span & Concentration:  Grossly intact   Fund of " Knowledge:  intact and appropriate to age and level of education     General Impression:     Encounter Diagnoses   Name Primary?    Bipolar I disorder, most recent episode (or current) mixed Yes    Attention deficit hyperactivity disorder (ADHD), combined type     Generalized anxiety disorder        Assessment & Plan    - Assessed mood stability and anxiety control, noting significant improvement despite recent life stressors.  - Evaluated effectiveness of current medication regimen, particularly noting positive impact of Vyvanse on focus and task completion.  - Considered recent weight loss efforts and improved lifestyle choices as contributing factors to overall stability.  - Determined to maintain current medication doses given stable mood and anxiety control.    BIPOLAR DISORDER:  - Continued Caplyta 42 mg daily.  - Continued Lamotrigine 150 mg daily.    ATTENTION-DEFICIT HYPERACTIVITY DISORDER:  - Continued Vyvanse 30 mg daily.    ANXIETY DISORDER:  - Continued Prozac 40 mg daily.  - Continued Buspirone 15 mg twice daily.    FOLLOW-UP:  - Follow up in 3 months for regular follow-up.  - Contact the office if needed before the next scheduled appointment.         I spent an additional 9 minutes performing E/M services with >50% spent on counseling, guidance, coordinating care (not Psychotherapy related) in addition to the 16 minutes performing Psychotherapy.    I spent a total of 25 minutes on the day of the visit. This includes face to face time and non-face to face time preparing to see the patient (eg, review of tests), obtaining and/or reviewing separately obtained history, documenting clinical information in the electronic or other health record, independently interpreting results and communicating results to the patient/family/caregiver, or care coordinator.       Milli Medina, PhD, MP  Advanced Practice Medical Psychologist  [facility]  [address]  [place]  [address]  [phone]   [phone] fax    This note was generated with the assistance of ambient listening technology. Verbal consent was obtained by the patient and accompanying visitor(s) for the recording of patient appointment to facilitate this note. I attest to having reviewed and edited the generated note for accuracy, though some syntax or spelling errors may persist. Please contact the author of this note for any clarification.

## 2025-07-30 DIAGNOSIS — F90.2 ATTENTION DEFICIT HYPERACTIVITY DISORDER (ADHD), COMBINED TYPE: ICD-10-CM

## 2025-07-31 RX ORDER — LISDEXAMFETAMINE DIMESYLATE 30 MG/1
30 CAPSULE ORAL EVERY MORNING
Qty: 30 CAPSULE | Refills: 0 | Status: SHIPPED | OUTPATIENT
Start: 2025-07-31 | End: 2025-08-30

## 2025-09-01 DIAGNOSIS — F90.2 ATTENTION DEFICIT HYPERACTIVITY DISORDER (ADHD), COMBINED TYPE: ICD-10-CM

## 2025-09-02 RX ORDER — LISDEXAMFETAMINE DIMESYLATE 30 MG/1
30 CAPSULE ORAL EVERY MORNING
Qty: 30 CAPSULE | Refills: 0 | Status: SHIPPED | OUTPATIENT
Start: 2025-09-02 | End: 2025-10-02